# Patient Record
Sex: MALE | Race: BLACK OR AFRICAN AMERICAN | Employment: PART TIME | URBAN - METROPOLITAN AREA
[De-identification: names, ages, dates, MRNs, and addresses within clinical notes are randomized per-mention and may not be internally consistent; named-entity substitution may affect disease eponyms.]

---

## 2017-01-12 ENCOUNTER — NURSE NAVIGATOR (OUTPATIENT)
Dept: FAMILY MEDICINE CLINIC | Age: 47
End: 2017-01-12

## 2017-02-23 ENCOUNTER — OFFICE VISIT (OUTPATIENT)
Dept: FAMILY MEDICINE CLINIC | Age: 47
End: 2017-02-23

## 2017-02-23 VITALS
OXYGEN SATURATION: 98 % | HEIGHT: 68 IN | SYSTOLIC BLOOD PRESSURE: 143 MMHG | RESPIRATION RATE: 16 BRPM | BODY MASS INDEX: 32.25 KG/M2 | HEART RATE: 86 BPM | WEIGHT: 212.8 LBS | DIASTOLIC BLOOD PRESSURE: 95 MMHG | TEMPERATURE: 97.3 F

## 2017-02-23 DIAGNOSIS — J06.9 ACUTE URI: Primary | ICD-10-CM

## 2017-02-23 DIAGNOSIS — R05.9 COUGH: ICD-10-CM

## 2017-02-23 RX ORDER — PROMETHAZINE HYDROCHLORIDE AND CODEINE PHOSPHATE 6.25; 1 MG/5ML; MG/5ML
1 SOLUTION ORAL
Qty: 118 ML | Refills: 0 | Status: SHIPPED | OUTPATIENT
Start: 2017-02-23 | End: 2017-11-10 | Stop reason: ALTCHOICE

## 2017-02-23 RX ORDER — BENZONATATE 200 MG/1
200 CAPSULE ORAL
Qty: 30 CAP | Refills: 0 | Status: SHIPPED | OUTPATIENT
Start: 2017-02-23 | End: 2017-03-02

## 2017-02-23 RX ORDER — SULINDAC 200 MG/1
TABLET ORAL
COMMUNITY
Start: 2017-01-13 | End: 2017-11-10 | Stop reason: ALTCHOICE

## 2017-02-23 RX ORDER — AZITHROMYCIN 250 MG/1
TABLET, FILM COATED ORAL
Qty: 6 TAB | Refills: 0 | Status: SHIPPED | OUTPATIENT
Start: 2017-02-23 | End: 2017-02-28

## 2017-02-23 NOTE — PROGRESS NOTES
Rogers Haas is a 52 y.o. male. Patient complains of post nasal drip, productive cough with  yellow colored sputum and sore throat. Symptoms chills. Onset of symptoms was 4 days ago, gradually worsening since that time. He denies a history of chest pain, shortness of breath and wheezing. is drinking moderate amounts of fluids. Past history is significant for no history of pneumonia or bronchitis. Patient is former smoker, quit years ago    Past Medical History:   Diagnosis Date    Acute right-sided low back pain without sciatica 11/4/2016    Anxiety 11/4/2016    Chronic pain     Contact dermatitis and other eczema, due to unspecified cause     hives    Diabetes mellitus without complication ([facility] 28/3/9943    Diabetes mellitus without complication ([facility] 66/3/6733    Dyslipidemia 12/2/2014    Ear ringing 11/4/2016    Encounter for immunization 11/4/2016    Erectile dysfunction 11/4/2016    Herniated disc     Hypertension     Hypovitaminosis D 12/2/2014    Multiple joint pain 11/4/2016    Obesity     Primary insomnia 11/4/2016    Right wrist pain 12/2/2014    Screen for STD (sexually transmitted disease) 11/4/2016     Past Surgical History:   Procedure Laterality Date    HX ORTHOPAEDIC      lumbar steroid injection for pain management     No Known Allergies  Current Outpatient Prescriptions on File Prior to Visit   Medication Sig Dispense Refill    cyclobenzaprine (FLEXERIL) 10 mg tablet Take 0.5 Tabs by mouth nightly as needed for Muscle Spasm(s). 20 Tab 0    metFORMIN (GLUCOPHAGE) 500 mg tablet Take 1 Tab by mouth two (2) times daily (with meals). 60 Tab 2    Lancets misc Check once daily 1 Each 11    glucose blood VI test strips (ASCENSIA AUTODISC VI, ONE TOUCH ULTRA TEST VI) strip Check once daily 50 Strip 5    omeprazole (PRILOSEC) 20 mg capsule Take 1 Cap by mouth daily. 30 Cap 2    traZODone (DESYREL) 100 mg tablet Take 1 Tab by mouth nightly.  30 Tab 2    tadalafil (CIALIS) 20 mg tablet Take 1 Tab by mouth as needed. 6 Tab 2    naproxen (NAPROSYN) 500 mg tablet Take 1 Tab by mouth two (2) times daily (with meals). 30 Tab 3     No current facility-administered medications on file prior to visit. Objective:      Visit Vitals    BP (!) 143/95    Pulse 86    Temp 97.3 °F (36.3 °C) (Oral)    Resp 16    Ht 5' 8\" (1.727 m)    Wt 212 lb 12.8 oz (96.5 kg)    SpO2 98%    BMI 32.36 kg/m2      Appears ill-appearing. Ears: bilateral TM's and external ear canals normal  Nose: clear discharge, moderate congestion  Oropharynx: erythematous  Neck: bilateral symmetric anterior adenopathy  Lungs: clear to auscultation, no wheezes, rales or rhonchi, symmetric air entry  CVS: regular rate and rhythm, S1, S2 normal, no murmur, click, rub or gallop  The abdomen is soft without tenderness or hepatosplenomegaly. Rapid Strep test is not done      Assessment/Plan:   viral upper respiratory illness, influenza, strep pharyngitis, sinusitis, nasopharyngitis, bronchitis and pneumonia  Per orders. Gargle, use acetaminophen or other OTC analgesic, and take Rx fully as prescribed. Call if other family members develop similar symptoms. See prn. Yue Egan was seen today for cough. Diagnoses and all orders for this visit:    Acute URI  -     azithromycin (ZITHROMAX) 250 mg tablet; Take 2 tablets today, then take 1 tablet daily  -     promethazine-codeine (PHENERGAN WITH CODEINE) 6.25-10 mg/5 mL syrup; Take 5 mL by mouth nightly as needed for Cough. Max Daily Amount: 5 mL. -     benzonatate (TESSALON) 200 mg capsule; Take 1 Cap by mouth three (3) times daily as needed for Cough for up to 7 days. Cough  -     azithromycin (ZITHROMAX) 250 mg tablet; Take 2 tablets today, then take 1 tablet daily  -     promethazine-codeine (PHENERGAN WITH CODEINE) 6.25-10 mg/5 mL syrup; Take 5 mL by mouth nightly as needed for Cough. Max Daily Amount: 5 mL. -     benzonatate (TESSALON) 200 mg capsule;  Take 1 Cap by mouth three (3) times daily as needed for Cough for up to 7 days. Follow-up Disposition:  Return in about 3 days (around 2/26/2017), or if symptoms worsen or fail to improve.       Avani Cherry MD  2/23/2017

## 2017-02-23 NOTE — PROGRESS NOTES
Chief Complaint   Patient presents with    Cough     red itchy eyes, congestion, yellow mucus, coughs til his chest hurts

## 2017-02-23 NOTE — MR AVS SNAPSHOT
Visit Information Date & Time Provider Department Dept. Phone Encounter #  
 2/23/2017  9:15 AM Nohelia Meyers MD [facility] at [address] [facility] [id number] Follow-up Instructions Return in about 3 days (around 2/26/2017), or if symptoms worsen or fail to improve. Your Appointments 2/23/2017  9:15 AM  
ACUTE CARE with Nohelia Meyers MD  
[facility] at [address] [facility] [address]) Appt Note: cough that causes back and chest pain, congestion and red itchy eyes. cp $5  2.23.17  
 [address] [facility] [address] [id number] [facility] Upcoming Health Maintenance Date Due  
 FOOT EXAM Q1 1/29/1980 EYE EXAM RETINAL OR DILATED Q1 1/29/1980 Pneumococcal 19-64 Medium Risk (1 of 1 - PPSV23) 1/29/1989 MICROALBUMIN Q1 3/18/2017 LIPID PANEL Q1 3/18/2017 HEMOGLOBIN A1C Q6M 5/4/2017 DTaP/Tdap/Td series (2 - Td) 11/20/2024 Allergies as of 2/23/2017  Review Complete On: 12/5/2016 By: Nohelia Meyers MD  
 No Known Allergies Current Immunizations  Reviewed on 2/22/2016 Name Date Influenza Vaccine 11/3/2016, 11/20/2014  1:26 PM  
 Tdap 11/20/2014  4:06 PM  
  
 Not reviewed this visit You Were Diagnosed With   
  
 Codes Comments Acute URI    -  Primary ICD-10-CM: J06.9 ICD-9-CM: 465.9 Cough     ICD-10-CM: R05 ICD-9-CM: 811. 2 Vitals BP  
  
  
  
  
  
 (!) 143/95 Vitals History BMI and BSA Data Body Mass Index Body Surface Area  
 32.36 kg/m 2 2.15 m 2 Preferred Pharmacy Pharmacy Name Phone [address] [facility], [address] [facility] [phone] Your Updated Medication List  
  
   
This list is accurate as of: 2/23/17  9:09 AM.  Always use your most recent med list.  
  
  
  
  
 azithromycin 250 mg tablet Commonly known as:  Sherren Mohair Take 2 tablets today, then take 1 tablet daily  
  
 benzonatate 200 mg capsule Commonly known as:  TESSALON Take 1 Cap by mouth three (3) times daily as needed for Cough for up to 7 days. cyclobenzaprine 10 mg tablet Commonly known as:  FLEXERIL Take 0.5 Tabs by mouth nightly as needed for Muscle Spasm(s). glucose blood VI test strips strip Commonly known as:  ASCENSIA AUTODISC VI, ONE TOUCH ULTRA TEST VI Check once daily Lancets Misc Check once daily  
  
 metFORMIN 500 mg tablet Commonly known as:  GLUCOPHAGE Take 1 Tab by mouth two (2) times daily (with meals). naproxen 500 mg tablet Commonly known as:  NAPROSYN Take 1 Tab by mouth two (2) times daily (with meals). omeprazole 20 mg capsule Commonly known as:  PRILOSEC Take 1 Cap by mouth daily. promethazine-codeine 6.25-10 mg/5 mL syrup Commonly known as:  PHENERGAN with CODEINE Take 5 mL by mouth nightly as needed for Cough. Max Daily Amount: 5 mL. sulindac 200 mg tablet Commonly known as:  CLINORIL  
  
 tadalafil 20 mg tablet Commonly known as:  CIALIS Take 1 Tab by mouth as needed. traZODone 100 mg tablet Commonly known as:  [place]ry [place] Take 1 Tab by mouth nightly. Prescriptions Printed Refills  
 promethazine-codeine (PHENERGAN WITH CODEINE) 6.25-10 mg/5 mL syrup 0 Sig: Take 5 mL by mouth nightly as needed for Cough. Max Daily Amount: 5 mL. Class: Print Route: Oral  
  
Prescriptions Sent to Pharmacy Refills  
 azithromycin (ZITHROMAX) 250 mg tablet 0 Sig: Take 2 tablets today, then take 1 tablet daily Class: Normal  
 Pharmacy: [facility] [address] [facility] [address] [facility] Ph #: [phone]  
 benzonatate (TESSALON) 200 mg capsule 0 Sig: Take 1 Cap by mouth three (3) times daily as needed for Cough for up to 7 days.   
 Class: Normal  
 Pharmacy: [facility] [address] [facility] [address] [facility] #: [phone] Route: Oral  
  
Follow-up Instructions Return in about 3 days (around 2/26/2017), or if symptoms worsen or fail to improve. Introducing [facility] & HEALTH SERVICES! Dear Onesimo Hunt: 
Thank you for requesting a Groopt account. Our records indicate that you already have an active Groopt account. You can access your account anytime at [email or web address] Did you know that you can access your hospital and ER discharge instructions at any time in Groopt? You can also review all of your test results from your hospital stay or ER visit. Additional Information If you have questions, please visit the Frequently Asked Questions section of the Groopt website at [email or web address] Remember, Groopt is NOT to be used for urgent needs. For medical emergencies, dial 911. Now available from your iPhone and Android! Please provide this summary of care documentation to your next provider. Your primary care clinician is listed as Avani Cherry. If you have any questions after today's visit, please call [phone].

## 2017-03-08 PROBLEM — M51.36 DDD (DEGENERATIVE DISC DISEASE), LUMBAR: Status: ACTIVE | Noted: 2017-03-08

## 2017-05-12 ENCOUNTER — TELEPHONE (OUTPATIENT)
Dept: FAMILY MEDICINE CLINIC | Age: 47
End: 2017-05-12

## 2017-05-12 ENCOUNTER — OFFICE VISIT (OUTPATIENT)
Dept: FAMILY MEDICINE CLINIC | Age: 47
End: 2017-05-12

## 2017-05-12 VITALS
BODY MASS INDEX: 32.13 KG/M2 | WEIGHT: 212 LBS | OXYGEN SATURATION: 96 % | DIASTOLIC BLOOD PRESSURE: 79 MMHG | HEIGHT: 68 IN | HEART RATE: 79 BPM | SYSTOLIC BLOOD PRESSURE: 135 MMHG | TEMPERATURE: 96.9 F | RESPIRATION RATE: 16 BRPM

## 2017-05-12 DIAGNOSIS — M79.7 FIBROMYALGIA: Primary | ICD-10-CM

## 2017-05-12 DIAGNOSIS — Z79.899 ENCOUNTER FOR LONG-TERM (CURRENT) USE OF OTHER MEDICATIONS: ICD-10-CM

## 2017-05-12 DIAGNOSIS — F45.20 HYPOCHONDRIACAL DISORDER: ICD-10-CM

## 2017-05-12 DIAGNOSIS — Z91.199 NONCOMPLIANCE: ICD-10-CM

## 2017-05-12 DIAGNOSIS — R07.89 ATYPICAL CHEST PAIN: ICD-10-CM

## 2017-05-12 DIAGNOSIS — E11.9 DIABETES MELLITUS WITHOUT COMPLICATION (HCC): ICD-10-CM

## 2017-05-12 RX ORDER — PREGABALIN 75 MG/1
75 CAPSULE ORAL 2 TIMES DAILY
Qty: 60 CAP | Refills: 1 | Status: SHIPPED | OUTPATIENT
Start: 2017-05-12 | End: 2017-11-10

## 2017-05-12 RX ORDER — CITALOPRAM 20 MG/1
20 TABLET, FILM COATED ORAL DAILY
Qty: 30 TAB | Refills: 1 | Status: SHIPPED | OUTPATIENT
Start: 2017-05-12 | End: 2017-11-10

## 2017-05-12 RX ORDER — DULOXETIN HYDROCHLORIDE 30 MG/1
30 CAPSULE, DELAYED RELEASE ORAL DAILY
Qty: 30 CAP | Refills: 1 | Status: SHIPPED | OUTPATIENT
Start: 2017-05-12 | End: 2017-11-10 | Stop reason: ALTCHOICE

## 2017-05-12 NOTE — PROGRESS NOTES
Kamar Clay is a 52 y.o. male    He have had multiple complains regularly. Wanting everything done. When sent to specialist, after negative work up he wants another referral for 2nd opinion. Please look at referral list.  He have been sent to Neuro, derm, GI, Rheum a couple of times, ENT, Orthopedics, etc... By me and a few other doctors. I was skylar with him and he smiled and agreed that he doesn't trust doctor. He goes home and read stuff online and if the doctor tells him differently he disagrees, wants referral and if that's not what he agrees to he ask for 2nd referral.  In the mean while he's not taking medications that doctors prescribed him because he disagrees. He gets very sensitive if I mentioned seeing Psych, says he's not crazy and there were no need, hence it's not been done by me, but it was mentioned a few times in the past.     He has various pain all over body that is negative on exam.  Non compliance with meds. Today he wants to address concern below:     Rash want refer to derm. For Keloid. After discussion he decided that can wait. ENT: didn't trust the first ENT wanted his throat scoped, but he'll agree to go back there. Thinks he has MS. Wanted to be tested for MS. He doesn't have any weakness of extremities or vision issue or loss of bowel or bladder function. But due to his multiple shoulder, joint aches, etc... I explained to him what MS is and he's OK with not getting another neuro referral for now. Sharp chest pain that comes randomly, lasting seconds. Can sit there and it happens. No associated SOB. Denies JAMESON. No edema or orthopnea. Denies Syncope, dizziness. Also says he think he has a heart murmur. I've never heard one and still do not hear one today. Currently he has no chest pain. Denies cough, fever, chills, JAMESON, SOB. We discussed trying medication for pain such as cymbalta or celexa and lyrica.   He's not sure but will look to see what he's willing to take. Reviewed: active problem list, medication list, allergies, notes from last encounter, lab results, imaging    A comprehensive review of systems was negative except for that written in the HPI. on 12 ROS. No Known Allergies  Current Outpatient Prescriptions on File Prior to Visit   Medication Sig Dispense Refill    metFORMIN (GLUCOPHAGE) 500 mg tablet Take 1 Tab by mouth two (2) times daily (with meals). 60 Tab 2    Lancets misc Check once daily 1 Each 11    glucose blood VI test strips (ASCENSIA AUTODISC VI, ONE TOUCH ULTRA TEST VI) strip Check once daily 50 Strip 5    omeprazole (PRILOSEC) 20 mg capsule Take 1 Cap by mouth daily. 30 Cap 2    sulindac (CLINORIL) 200 mg tablet       promethazine-codeine (PHENERGAN WITH CODEINE) 6.25-10 mg/5 mL syrup Take 5 mL by mouth nightly as needed for Cough. Max Daily Amount: 5 mL. 118 mL 0    traZODone (DESYREL) 100 mg tablet Take 1 Tab by mouth nightly. 30 Tab 2    tadalafil (CIALIS) 20 mg tablet Take 1 Tab by mouth as needed. 6 Tab 2    cyclobenzaprine (FLEXERIL) 10 mg tablet Take 0.5 Tabs by mouth nightly as needed for Muscle Spasm(s). 20 Tab 0    naproxen (NAPROSYN) 500 mg tablet Take 1 Tab by mouth two (2) times daily (with meals). 30 Tab 3     No current facility-administered medications on file prior to visit.       Patient Active Problem List   Diagnosis Code    Obesity E66.9    Fatigue R53.83    abdominal pain LLQ R10.9    Bursitis of left shoulder M75.52    Tinnitus of both ears H93.13    Fatigue R53.83    Decreased libido R68.82    Elevated liver enzymes R74.8    Hypovitaminosis D E55.9    Right wrist pain M25.531    Dyslipidemia O67.1    Eosinophilic esophagitis O65.1    Alarcon's esophagus without dysplasia K22.70    Diabetes mellitus without complication (HCC) Z33.7    Arthralgia of multiple sites, bilateral M25.50    Acute right-sided low back pain without sciatica M54.5    Primary insomnia F51.01  Anxiety F41.9    Ear ringing H93.19    Erectile dysfunction N52.9    Encounter for immunization [place] [address] for STD (sexually transmitted disease) Z11.3    Alcohol induced liver disorder ([address] K70.9    DDD (degenerative disc disease), lumbar M51.36    Noncompliance Z91.19    Hypochondriacal disorder F45.20       Visit Vitals    /79 (BP 1 Location: Left arm, BP Patient Position: Sitting)    Pulse 79    Temp 96.9 °F (36.1 °C) (Oral)    Resp 16    Ht 5' 8\" (1.727 m)    Wt 212 lb (96.2 kg)    SpO2 96%    BMI 32.23 kg/m2     General appearance: alert, cooperative, no distress, appears stated age  Neurologic: Alert and oriented X 3, normal strength and tone, symmetric. Normal without focal findings. Cranial nerves 2-12 intact. Normal coordination and gait. Mental status: Alert, oriented, thought content appropriate, affect: stable, mood-congruent. Head: Normocephalic, without obvious abnormality, atraumatic  Eyes: conjunctivae/corneas clear. PERRL, EOM's intact. Neck: supple, symmetrical, trachea midline, no JVD  Lungs: clear to auscultation bilaterally  Heart: regular rate and rhythm, S1, S2 normal, no murmur, click, rub or gallop  Abdomen: soft, non-tender. Extremities: extremities normal, atraumatic, no cyanosis or edema      EKG: normal EKG, normal sinus rhythm, normal Axis. Assessment/Plans:    More than 40mins was spend with this patient. Fibromyalgia  -     METABOLIC PANEL, COMPREHENSIVE  -     HEMOGLOBIN A1C W/O EAG  -     CBC W/O DIFF  -     DULoxetine (CYMBALTA) 30 mg capsule; Take 1 Cap by mouth daily. -     pregabalin (LYRICA) 75 mg capsule; Take 1 Cap by mouth two (2) times a day. Max Daily Amount: 150 mg.  -     citalopram (CELEXA) 20 mg tablet; Take 1 Tab by mouth daily. Diabetes mellitus without complication (HCC)  -     METABOLIC PANEL, COMPREHENSIVE  -     HEMOGLOBIN A1C W/O EAG  -     CBC W/O DIFF  -     DULoxetine (CYMBALTA) 30 mg capsule;  Take 1 Cap by mouth daily. -     pregabalin (LYRICA) 75 mg capsule; Take 1 Cap by mouth two (2) times a day. Max Daily Amount: 150 mg.  -     citalopram (CELEXA) 20 mg tablet; Take 1 Tab by mouth daily. Encounter for long-term (current) use of other medications  -     METABOLIC PANEL, COMPREHENSIVE  -     HEMOGLOBIN A1C W/O EAG  -     CBC W/O DIFF  -     DULoxetine (CYMBALTA) 30 mg capsule; Take 1 Cap by mouth daily. -     pregabalin (LYRICA) 75 mg capsule; Take 1 Cap by mouth two (2) times a day. Max Daily Amount: 150 mg.  -     citalopram (CELEXA) 20 mg tablet; Take 1 Tab by mouth daily. Atypical chest pain  -     AMB POC EKG ROUTINE W/ 12 LEADS, INTER & REP    Noncompliance    Hypochondriacal disorder      Discussed plans, risk/benefits of treatments/observations. Through the use of shared decision making, above plans were agreed upon. Medication compliance advised. Patient verbalized understanding. Follow-up Disposition:  Return in about 4 weeks (around 6/9/2017) for diabetes, fibromyalgia, meds.       Blossom Kay MD  5/22/2017

## 2017-05-12 NOTE — MR AVS SNAPSHOT
Visit Information Date & Time Provider Department Dept. Phone Encounter #  
 5/12/2017  4:00 PM Jere White MD [facility] at [address] [id number] Follow-up Instructions Return in about 4 weeks (around 6/9/2017) for diabetes, fibromyalgia, meds. Upcoming Health Maintenance Date Due  
 FOOT EXAM Q1 1/29/1980 EYE EXAM RETINAL OR DILATED Q1 1/29/1980 Pneumococcal 19-64 Medium Risk (1 of 1 - PPSV23) 1/29/1989 MICROALBUMIN Q1 3/18/2017 LIPID PANEL Q1 3/18/2017 HEMOGLOBIN A1C Q6M 5/4/2017 INFLUENZA AGE 9 TO ADULT 8/1/2017 DTaP/Tdap/Td series (2 - Td) 11/20/2024 Allergies as of 5/12/2017  Review Complete On: 2/23/2017 By: Jere White MD  
 No Known Allergies Current Immunizations  Reviewed on 2/22/2016 Name Date Influenza Vaccine 11/3/2016, 11/20/2014  1:26 PM  
 Tdap 11/20/2014  4:06 PM  
  
 Not reviewed this visit You Were Diagnosed With   
  
 Codes Comments Fibromyalgia    -  Primary ICD-10-CM: M79.7 ICD-9-CM: 729.1 Diabetes mellitus without complication ([facility]     DPB-24-HW: E11.9 ICD-9-CM: 250.00 Encounter for long-term (current) use of other medications     ICD-10-CM: Z79.899 ICD-9-CM: V58.69 Vitals Weight(growth percentile) BMI Smoking Status 212 lb (96.2 kg) 32.23 kg/m2 Former Smoker BMI and BSA Data Body Mass Index Body Surface Area  
 32.23 kg/m 2 2.15 m 2 [facility] [address] [facility] [phone] Your Updated Medication List  
  
   
This list is accurate as of: 5/12/17  4:11 PM.  Always use your most recent med list.  
  
  
  
  
 citalopram 20 mg tablet Commonly known as:  Garon Eng Take 1 Tab by mouth daily. cyclobenzaprine 10 mg tablet Commonly known as:  FLEXERIL  
 Take 0.5 Tabs by mouth nightly as needed for Muscle Spasm(s). DULoxetine 30 mg capsule Commonly known as:  CYMBALTA Take 1 Cap by mouth daily. glucose blood VI test strips strip Commonly known as:  ASCENSIA AUTODISC VI, ONE TOUCH ULTRA TEST VI Check once daily Lancets Misc Check once daily  
  
 metFORMIN 500 mg tablet Commonly known as:  GLUCOPHAGE Take 1 Tab by mouth two (2) times daily (with meals). naproxen 500 mg tablet Commonly known as:  NAPROSYN Take 1 Tab by mouth two (2) times daily (with meals). omeprazole 20 mg capsule Commonly known as:  PRILOSEC Take 1 Cap by mouth daily. pregabalin 75 mg capsule Commonly known as:  [place] Beery Take 1 Cap by mouth two (2) times a day. Max Daily Amount: 150 mg.  
  
 promethazine-codeine 6.25-10 mg/5 mL syrup Commonly known as:  PHENERGAN with CODEINE Take 5 mL by mouth nightly as needed for Cough. Max Daily Amount: 5 mL. sulindac 200 mg tablet Commonly known as:  CLINORIL  
  
 tadalafil 20 mg tablet Commonly known as:  CIALIS Take 1 Tab by mouth as needed. traZODone 100 mg tablet Commonly known as:  Waylon Victorino Take 1 Tab by mouth nightly. Prescriptions Printed Refills  
 pregabalin (LYRICA) 75 mg capsule 1 Sig: Take 1 Cap by mouth two (2) times a day. Max Daily Amount: 150 mg.  
 Class: Print Route: Oral  
  
Prescriptions Sent to Pharmacy Refills DULoxetine (CYMBALTA) 30 mg capsule 1 Sig: Take 1 Cap by mouth daily. Class: Normal  
 Pharmacy: [facility], [address] [facility] Ph #: [phone] Route: Oral  
 citalopram (CELEXA) 20 mg tablet 1 Sig: Take 1 Tab by mouth daily. Class: Normal  
 Pharmacy: [facility], [address] [place] [address] RD [address] [facility] Ph #: [phone] Route: Oral  
  
We Performed the Following CBC W/O DIFF [91937 CPT(R)] HEMOGLOBIN A1C W/O EAG [05967 CPT(R)] METABOLIC PANEL, COMPREHENSIVE [66849 CPT(R)] Follow-up Instructions Return in about 4 weeks (around 6/9/2017) for diabetes, fibromyalgia, meds. Introducing [facility] & HEALTH SERVICES! Dear Laurie Lemos: 
Thank you for requesting a WHOOP account. Our records indicate that you already have an active WHOOP account. You can access your account anytime at [email or web address] Did you know that you can access your hospital and ER discharge instructions at any time in WHOOP? You can also review all of your test results from your hospital stay or ER visit. Additional Information If you have questions, please visit the Frequently Asked Questions section of the WHOOP website at [email or web address] Remember, WHOOP is NOT to be used for urgent needs. For medical emergencies, dial 911. Now available from your iPhone and Android! Please provide this summary of care documentation to your next provider. Your primary care clinician is listed as Cliff Saleh. If you have any questions after today's visit, please call [phone].

## 2017-05-20 LAB
ALBUMIN SERPL-MCNC: 4.4 G/DL (ref 3.5–5.5)
ALBUMIN/GLOB SERPL: 1.9 {RATIO} (ref 1.2–2.2)
ALP SERPL-CCNC: 55 IU/L (ref 39–117)
ALT SERPL-CCNC: 53 IU/L (ref 0–44)
AST SERPL-CCNC: 28 IU/L (ref 0–40)
BILIRUB SERPL-MCNC: <0.2 MG/DL (ref 0–1.2)
BUN SERPL-MCNC: 11 MG/DL (ref 6–24)
BUN/CREAT SERPL: 11 (ref 9–20)
CALCIUM SERPL-MCNC: 9.5 MG/DL (ref 8.7–10.2)
CHLORIDE SERPL-SCNC: 103 MMOL/L (ref 96–106)
CO2 SERPL-SCNC: 21 MMOL/L (ref 18–29)
CREAT SERPL-MCNC: 1.01 MG/DL (ref 0.76–1.27)
ERYTHROCYTE [DISTWIDTH] IN BLOOD BY AUTOMATED COUNT: 12.6 % (ref 12.3–15.4)
GLOBULIN SER CALC-MCNC: 2.3 G/DL (ref 1.5–4.5)
GLUCOSE SERPL-MCNC: 153 MG/DL (ref 65–99)
HBA1C MFR BLD: 6.4 % (ref 4.8–5.6)
HCT VFR BLD AUTO: 39 % (ref 37.5–51)
HGB BLD-MCNC: 12.8 G/DL (ref 12.6–17.7)
MCH RBC QN AUTO: 28.6 PG (ref 26.6–33)
MCHC RBC AUTO-ENTMCNC: 32.8 G/DL (ref 31.5–35.7)
MCV RBC AUTO: 87 FL (ref 79–97)
PLATELET # BLD AUTO: 374 X10E3/UL (ref 150–379)
POTASSIUM SERPL-SCNC: 4.4 MMOL/L (ref 3.5–5.2)
PROT SERPL-MCNC: 6.7 G/DL (ref 6–8.5)
RBC # BLD AUTO: 4.47 X10E6/UL (ref 4.14–5.8)
SODIUM SERPL-SCNC: 140 MMOL/L (ref 134–144)
WBC # BLD AUTO: 6.6 X10E3/UL (ref 3.4–10.8)

## 2017-05-22 PROBLEM — F45.20 HYPOCHONDRIACAL DISORDER: Status: ACTIVE | Noted: 2017-05-22

## 2017-05-22 PROBLEM — Z91.199 NONCOMPLIANCE: Status: ACTIVE | Noted: 2017-05-22

## 2017-05-25 ENCOUNTER — TELEPHONE (OUTPATIENT)
Dept: FAMILY MEDICINE CLINIC | Age: 47
End: 2017-05-25

## 2017-06-15 ENCOUNTER — TELEPHONE (OUTPATIENT)
Dept: FAMILY MEDICINE CLINIC | Age: 47
End: 2017-06-15

## 2017-06-15 DIAGNOSIS — M54.5 LOW BACK PAIN, UNSPECIFIED BACK PAIN LATERALITY, UNSPECIFIED CHRONICITY, WITH SCIATICA PRESENCE UNSPECIFIED: Primary | ICD-10-CM

## 2017-06-20 ENCOUNTER — TELEPHONE (OUTPATIENT)
Dept: FAMILY MEDICINE CLINIC | Age: 47
End: 2017-06-20

## 2017-06-20 NOTE — TELEPHONE ENCOUNTER
Returned call no answer or voice message set up unable to leave message. If pt should return call he needs to make appt. To be seen by Dr. Gabriel Perales has he was due back 6/8/17 and did not make appt.  Now asking for referral to cardiologist.

## 2017-06-21 DIAGNOSIS — K21.9 GASTROESOPHAGEAL REFLUX DISEASE WITHOUT ESOPHAGITIS: ICD-10-CM

## 2017-06-21 RX ORDER — OMEPRAZOLE 20 MG/1
20 CAPSULE, DELAYED RELEASE ORAL DAILY
Qty: 30 CAP | Refills: 5 | Status: SHIPPED | OUTPATIENT
Start: 2017-06-21

## 2017-06-29 ENCOUNTER — TELEPHONE (OUTPATIENT)
Dept: FAMILY MEDICINE CLINIC | Age: 47
End: 2017-06-29

## 2017-06-29 NOTE — TELEPHONE ENCOUNTER
Pt would like a referral for: Dermatologist     7/19/17 Ignacio Leon on [facility]   Re: block spots on his body     Pt phone number is [phone]

## 2017-06-29 NOTE — TELEPHONE ENCOUNTER
Spoke with pt he said he wants his meds to go to [facility]. It has been changed in his chart. Told him he was due to see  This month and did not f/u that may be why he only gave him 30 pills.

## 2017-06-29 NOTE — TELEPHONE ENCOUNTER
Pt needs a prescription sent to new [address] [place] [address] Road     Re; omeprazole (PRILOSEC) 20 mg capsule & he would like 90 day supply     Best number to reach him is [phone]

## 2017-10-26 ENCOUNTER — TELEPHONE (OUTPATIENT)
Dept: FAMILY MEDICINE CLINIC | Age: 47
End: 2017-10-26

## 2017-10-26 NOTE — TELEPHONE ENCOUNTER
----- Message from Kathya Marie sent at 10/26/2017 12:04 PM EDT -----  Regarding: Dr. Zee Marie  The patient is requesting a call back to discuss his heart situation.  (m)[phone]

## 2017-11-10 ENCOUNTER — OFFICE VISIT (OUTPATIENT)
Dept: FAMILY MEDICINE CLINIC | Age: 47
End: 2017-11-10

## 2017-11-10 VITALS
SYSTOLIC BLOOD PRESSURE: 144 MMHG | WEIGHT: 206.8 LBS | HEIGHT: 68 IN | BODY MASS INDEX: 31.34 KG/M2 | DIASTOLIC BLOOD PRESSURE: 82 MMHG | TEMPERATURE: 98.2 F | RESPIRATION RATE: 16 BRPM | OXYGEN SATURATION: 97 % | HEART RATE: 80 BPM

## 2017-11-10 DIAGNOSIS — E11.9 DIABETES MELLITUS WITHOUT COMPLICATION (HCC): Primary | ICD-10-CM

## 2017-11-10 DIAGNOSIS — M54.50 ACUTE RIGHT-SIDED LOW BACK PAIN WITHOUT SCIATICA: ICD-10-CM

## 2017-11-10 DIAGNOSIS — Z23 ENCOUNTER FOR IMMUNIZATION: ICD-10-CM

## 2017-11-10 DIAGNOSIS — M25.50 MULTIPLE JOINT PAIN: ICD-10-CM

## 2017-11-10 DIAGNOSIS — M79.7 FIBROMYALGIA: ICD-10-CM

## 2017-11-10 DIAGNOSIS — R00.2 PALPITATION: ICD-10-CM

## 2017-11-10 RX ORDER — OMEPRAZOLE 40 MG/1
CAPSULE, DELAYED RELEASE ORAL
COMMUNITY
Start: 2017-07-05 | End: 2017-12-08 | Stop reason: ALTCHOICE

## 2017-11-10 RX ORDER — DICYCLOMINE HYDROCHLORIDE 10 MG/1
CAPSULE ORAL
COMMUNITY
Start: 2017-06-20 | End: 2017-12-08 | Stop reason: ALTCHOICE

## 2017-11-10 RX ORDER — NAPROXEN 500 MG/1
500 TABLET ORAL 2 TIMES DAILY WITH MEALS
Qty: 30 TAB | Refills: 3 | Status: SHIPPED | OUTPATIENT
Start: 2017-11-10 | End: 2022-09-27

## 2017-11-10 RX ORDER — DULOXETIN HYDROCHLORIDE 60 MG/1
60 CAPSULE, DELAYED RELEASE ORAL
Qty: 30 CAP | Refills: 2 | Status: SHIPPED | OUTPATIENT
Start: 2017-11-10 | End: 2022-09-27

## 2017-11-10 RX ORDER — METFORMIN HYDROCHLORIDE 500 MG/1
TABLET ORAL
COMMUNITY
Start: 2017-06-20 | End: 2017-11-10 | Stop reason: SDUPTHER

## 2017-11-10 RX ORDER — METFORMIN HYDROCHLORIDE 500 MG/1
500 TABLET ORAL 2 TIMES DAILY WITH MEALS
Qty: 60 TAB | Refills: 2 | Status: SHIPPED | OUTPATIENT
Start: 2017-11-10

## 2017-11-10 RX ORDER — METHOCARBAMOL 750 MG/1
750 TABLET, FILM COATED ORAL
COMMUNITY
Start: 2017-07-07 | End: 2017-11-10 | Stop reason: ALTCHOICE

## 2017-11-10 RX ORDER — MELOXICAM 15 MG/1
15 TABLET ORAL
COMMUNITY
Start: 2017-07-07 | End: 2017-11-10 | Stop reason: ALTCHOICE

## 2017-11-10 RX ORDER — CYCLOBENZAPRINE HCL 10 MG
5 TABLET ORAL
Qty: 20 TAB | Refills: 0 | Status: SHIPPED | OUTPATIENT
Start: 2017-11-10 | End: 2022-09-27

## 2017-11-10 NOTE — MR AVS SNAPSHOT
Visit Information Date & Time Provider Department Dept. Phone Encounter #  
 11/10/2017  3:00 PM Pedro Coreas MD [facility] at [address] [id number] Upcoming Health Maintenance Date Due  
 FOOT EXAM Q1 1/29/1980 EYE EXAM RETINAL OR DILATED Q1 1/29/1980 Pneumococcal 19-64 Medium Risk (1 of 1 - PPSV23) 1/29/1989 MICROALBUMIN Q1 3/18/2017 LIPID PANEL Q1 3/18/2017 Influenza Age 5 to Adult 8/1/2017 HEMOGLOBIN A1C Q6M 11/19/2017 DTaP/Tdap/Td series (2 - Td) 11/20/2024 Allergies as of 11/10/2017  Review Complete On: 11/10/2017 By: Abby Mckinnon LPN No Known Allergies Current Immunizations  Reviewed on 2/22/2016 Name Date Influenza Vaccine 11/3/2016, 11/20/2014  1:26 PM  
 Influenza Vaccine (Quad) PF  Incomplete Pneumococcal Polysaccharide (PPSV-23)  Incomplete Tdap 11/20/2014  4:06 PM  
  
 Not reviewed this visit You Were Diagnosed With   
  
 Codes Comments Encounter for immunization    -  Primary ICD-10-CM: V85 ICD-9-CM: V03.89 Diabetes mellitus without complication ([facility]     MIRTHA-98-EA: E11.9 ICD-9-CM: 250.00 Acute right-sided low back pain without sciatica     ICD-10-CM: M54.5 ICD-9-CM: 724.2 Fibromyalgia     ICD-10-CM: M79.7 ICD-9-CM: 729.1 Palpitation     ICD-10-CM: R00.2 ICD-9-CM: 785.1 Multiple joint pain     ICD-10-CM: M25.50 ICD-9-CM: 719.49 Vitals BP Pulse Temp Resp Height(growth percentile) Weight(growth percentile) 144/82 (BP 1 Location: Right arm, BP Patient Position: Sitting) 80 98.2 °F (36.8 °C) (Oral) 16 5' 8\" (1.727 m) 206 lb 12.8 oz (93.8 kg) SpO2 BMI Smoking Status 97% 31.44 kg/m2 Former Smoker BMI and BSA Data Body Mass Index Body Surface Area  
 31.44 kg/m 2 2.12 m 2 Preferred Pharmacy Pharmacy Name Phone [email or web address], [address] [phone] Your Updated Medication List  
  
   
 This list is accurate as of: 11/10/17  4:41 PM.  Always use your most recent med list.  
  
  
  
  
 cyclobenzaprine 10 mg tablet Commonly known as:  FLEXERIL Take 0.5 Tabs by mouth nightly as needed for Muscle Spasm(s). dicyclomine 10 mg capsule Commonly known as:  BENTYL DULoxetine 60 mg capsule Commonly known as:  CYMBALTA Take 1 Cap by mouth nightly. glucose blood VI test strips strip Commonly known as:  ASCENSIA AUTODISC VI, ONE TOUCH ULTRA TEST VI Check once daily Lancets Misc Check once daily  
  
 metFORMIN 500 mg tablet Commonly known as:  GLUCOPHAGE Take 1 Tab by mouth two (2) times daily (with meals). naproxen 500 mg tablet Commonly known as:  NAPROSYN Take 1 Tab by mouth two (2) times daily (with meals). * omeprazole 20 mg capsule Commonly known as:  PRILOSEC Take 1 Cap by mouth daily. * omeprazole 40 mg capsule Commonly known as:  PRILOSEC  
  
 tadalafil 20 mg tablet Commonly known as:  CIALIS Take 1 Tab by mouth as needed. * Notice: This list has 2 medication(s) that are the same as other medications prescribed for you. Read the directions carefully, and ask your doctor or other care provider to review them with you. Prescriptions Sent to Pharmacy Refills  
 cyclobenzaprine (FLEXERIL) 10 mg tablet 0 Sig: Take 0.5 Tabs by mouth nightly as needed for Muscle Spasm(s). Class: Normal  
 Pharmacy: [email or web address] [address] Ph #: [phone] Route: Oral  
 metFORMIN (GLUCOPHAGE) 500 mg tablet 2 Sig: Take 1 Tab by mouth two (2) times daily (with meals). Class: Normal  
 Pharmacy: [email or web address] [address] Ph #: [phone] Route: Oral  
 DULoxetine (CYMBALTA) 60 mg capsule 2 Sig: Take 1 Cap by mouth nightly.   
 Class: Normal  
 Pharmacy: [email or web address] [address] Ph #: [phone] Route: Oral  
 naproxen (NAPROSYN) 500 mg tablet 3 Sig: Take 1 Tab by mouth two (2) times daily (with meals). Class: Normal  
 Pharmacy: [email or web address] [address] Ph #: [phone] Route: Oral  
  
We Performed the Following HEMOGLOBIN A1C W/O EAG [53848 CPT(R)] INFLUENZA VIRUS VAC QUAD,SPLIT,PRESV FREE SYRINGE IM [id number] CPT(R)] METABOLIC PANEL, COMPREHENSIVE [16173 CPT(R)] PNEUMOCOCCAL POLYSACCHARIDE VACCINE, 23-VALENT, ADULT OR IMMUNOSUPPRESSED PT DOSE, [34253 CPT(R)] SD IMMUNIZ ADMIN,1 SINGLE/COMB VAC/TOXOID [id number] CPT(R)] REFERRAL TO CARDIAC ELECTROPHYSIOLOGY [REF11 Custom] Comments:  
 Per pt request.  Palpitation, EKG here normal.  
  
Referral Information Referral ID Referred By Referred To  
  
 [id number] [facility], [address] [place] [address], [address] Phone: [phone] Fax: [phone] Visits Status Start Date End Date 1 New Request 11/10/17 11/10/18 If your referral has a status of pending review or denied, additional information will be sent to support the outcome of this decision. Introducing [facility]! Dear Yrn Magallanes: 
Thank you for requesting a [employer] account. Our records indicate that you already have an active [employer] account. You can access your account anytime at [email or web address] [email or web address]/[employer] Did you know that you can access your hospital and ER discharge instructions at any time in [employer]? You can also review all of your test results from your hospital stay or ER visit. Additional Information If you have questions, please visit the Frequently Asked Questions section of the [employer] website at [email or web address] Remember, [employer] is NOT to be used for urgent needs. For medical emergencies, dial 911. Now available from your iPhone and Android! Please provide this summary of care documentation to your next provider. Your primary care clinician is listed as Jaleesa Bryant. If you have any questions after today's visit, please call [phone].

## 2017-11-10 NOTE — PROGRESS NOTES
Chief Complaint   Patient presents with    Diabetes    Medication Management    LOW BACK PAIN     Patient here for follow up on DM, meds and low back pain and labs.

## 2017-11-18 LAB
ALBUMIN SERPL-MCNC: 4.3 G/DL (ref 3.5–5.5)
ALBUMIN/GLOB SERPL: 1.7 {RATIO} (ref 1.2–2.2)
ALP SERPL-CCNC: 56 IU/L (ref 39–117)
ALT SERPL-CCNC: 56 IU/L (ref 0–44)
AST SERPL-CCNC: 31 IU/L (ref 0–40)
BILIRUB SERPL-MCNC: 0.2 MG/DL (ref 0–1.2)
BUN SERPL-MCNC: 8 MG/DL (ref 6–24)
BUN/CREAT SERPL: 10 (ref 9–20)
CALCIUM SERPL-MCNC: 9.5 MG/DL (ref 8.7–10.2)
CHLORIDE SERPL-SCNC: 101 MMOL/L (ref 96–106)
CO2 SERPL-SCNC: 25 MMOL/L (ref 18–29)
CREAT SERPL-MCNC: 0.77 MG/DL (ref 0.76–1.27)
GFR SERPLBLD CREATININE-BSD FMLA CKD-EPI: 108 ML/MIN/1.73
GFR SERPLBLD CREATININE-BSD FMLA CKD-EPI: 125 ML/MIN/1.73
GLOBULIN SER CALC-MCNC: 2.5 G/DL (ref 1.5–4.5)
GLUCOSE SERPL-MCNC: 115 MG/DL (ref 65–99)
HBA1C MFR BLD: 6.2 % (ref 4.8–5.6)
POTASSIUM SERPL-SCNC: 4.4 MMOL/L (ref 3.5–5.2)
PROT SERPL-MCNC: 6.8 G/DL (ref 6–8.5)
SODIUM SERPL-SCNC: 142 MMOL/L (ref 134–144)

## 2017-11-22 NOTE — PROGRESS NOTES
Tez Woodson is a 52 y.o. male    He have had multiple complains regularly. Wanting everything done. When sent to specialist, after negative work up he wants another referral for 2nd opinion. Please look at referral list.  He have been sent to Neuro, derm, GI, Rheum a couple of times, ENT, Orthopedics, etc... By me and a few other doctors. I was skylar with him and he smiled and agreed that he doesn't trust doctor. He goes home and read stuff online and if the doctor tells him differently he disagrees, wants referral and if that's not what he agrees to he ask for 2nd referral.  In the mean while he's not taking medications that doctors prescribed him because he disagrees. He gets very sensitive if I mentioned seeing Psych, says he's not crazy and there were no need, hence it's not been done by me, but it was mentioned a few times in the past.     He has various pain all over body that is negative on exam.  Non compliance with meds. Fibromyalgia/depression anxiety: last visit in May 2017 I started him on cymbalta (sent in lyrica and celexa as well incase his insurance says no), and he was taking cymbalta and admitted that it helped, hence i've not seen him in 6 months, but he ran out of cymbalta and never followed up. He's here today complaining of multiple pain again. Multiple joint pain everywhere, it just hurts, unsure where. Back pain without red flags. Palpitation, last visit we did EKG and it was normal. He wanted to be refer to Cardiology. I told him lets do holter monitor first, if there are identifying problems we'll refer him to cardio. DM2: on metformin, we'll get labs monitoring. We discussed that he should find another PCP doctor for second opinion, he agrees. Reviewed: active problem list, medication list, allergies, notes from last encounter, lab results, imaging    A comprehensive review of systems was negative except for that written in the HPI.  on 12 ROS.    No Known Allergies  Current Outpatient Prescriptions on File Prior to Visit   Medication Sig Dispense Refill    omeprazole (PRILOSEC) 20 mg capsule Take 1 Cap by mouth daily. 30 Cap 5    Lancets misc Check once daily 1 Each 11    glucose blood VI test strips (ASCENSIA AUTODISC VI, ONE TOUCH ULTRA TEST VI) strip Check once daily 50 Strip 5    tadalafil (CIALIS) 20 mg tablet Take 1 Tab by mouth as needed. 6 Tab 2     No current facility-administered medications on file prior to visit. Patient Active Problem List   Diagnosis Code    Obesity E66.9    Fatigue R53.83    abdominal pain LLQ R10.9    Bursitis of left shoulder M75.52    Tinnitus of both ears H93.13    Fatigue R53.83    Decreased libido R68.82    Elevated liver enzymes R74.8    Hypovitaminosis D E55.9    Right wrist pain M25.531    Dyslipidemia B84.8    Eosinophilic esophagitis Z07.5    Alarcon's esophagus without dysplasia K22.70    Diabetes mellitus without complication (HCC) U28.7    Arthralgia of multiple sites, bilateral M25.50    Acute right-sided low back pain without sciatica M54.5    Primary insomnia F51.01    Anxiety F41.9    Ear ringing H93.19    Erectile dysfunction N52.9    Encounter for immunization Z23    Screen for STD (sexually transmitted disease) Z11.3    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 Normal without focal findings. Cranial nerves 2-12 intact. Normal coordination and gait.   Mental status: Alert, oriented, thought content appropriate, affect: stable, mood-congruent. Head: Normocephalic, without obvious abnormality, atraumatic  Eyes: conjunctivae/corneas clear. PERRL, EOM's intact. Neck: supple, symmetrical, trachea midline, no JVD  Lungs: clear to auscultation bilaterally  Heart: regular rate and rhythm, S1, S2 normal, no murmur, click, rub or gallop  Abdomen: soft, non-tender. Extremities: extremities normal, atraumatic, no cyanosis or edema      Assessment/Plans:    Diagnoses and all orders for this visit:    1. Diabetes mellitus without complication (HCC)  -     METABOLIC PANEL, COMPREHENSIVE  -     HEMOGLOBIN A1C W/O EAG  -     metFORMIN (GLUCOPHAGE) 500 mg tablet; Take 1 Tab by mouth two (2) times daily (with meals). 2. Acute right-sided low back pain without sciatica  -     cyclobenzaprine (FLEXERIL) 10 mg tablet; Take 0.5 Tabs by mouth nightly as needed for Muscle Spasm(s). 3. Fibromyalgia  -     DULoxetine (CYMBALTA) 60 mg capsule; Take 1 Cap by mouth nightly. 4. Palpitation  -     REFERRAL TO CARDIAC ELECTROPHYSIOLOGY    5. Multiple joint pain  -     naproxen (NAPROSYN) 500 mg tablet; Take 1 Tab by mouth two (2) times daily (with meals). 6. Encounter for immunization  -     [place] IMMUNIZ ADMIN,1 SINGLE/COMB VAC/TOXOID  -     Pneumococcal polysaccharide vaccine, 23-valent, adult or immunosuppressed pt dose  -     Influenza virus vaccine (QUADRIVALENT PRES FREE SYRINGE) IM ([id number])    Discussed plans, risk/benefits of treatments/observations. Through the use of shared decision making, above plans were agreed upon. Medication compliance advised. Patient verbalized understanding. Follow-up Disposition:  Return for he will find another PCP.       Lorena Morrow MD  11/21/2017

## 2017-11-30 ENCOUNTER — TELEPHONE (OUTPATIENT)
Dept: FAMILY MEDICINE CLINIC | Age: 47
End: 2017-11-30

## 2017-11-30 NOTE — TELEPHONE ENCOUNTER
Returned call to pt read him his recent lab results. He wanted to know about his liver, told him per dr. Linda Zhang that it was a slight increase and doctor recheck on his next visit. He said ok thank you.

## 2017-11-30 NOTE — TELEPHONE ENCOUNTER
Pt would like a call with lab results at [phone]    Pt let him know they were mailed and he stated he doesn't live at that address anymore (since his 11/10/17 appt)     Updated in [place]

## 2017-12-08 ENCOUNTER — OFFICE VISIT (OUTPATIENT)
Dept: CARDIOLOGY CLINIC | Age: 47
End: 2017-12-08

## 2017-12-08 VITALS
HEART RATE: 72 BPM | WEIGHT: 206.2 LBS | HEIGHT: 68 IN | BODY MASS INDEX: 31.25 KG/M2 | OXYGEN SATURATION: 98 % | DIASTOLIC BLOOD PRESSURE: 92 MMHG | SYSTOLIC BLOOD PRESSURE: 146 MMHG

## 2017-12-08 DIAGNOSIS — R00.2 PALPITATIONS: Primary | ICD-10-CM

## 2017-12-08 DIAGNOSIS — E78.5 DYSLIPIDEMIA: ICD-10-CM

## 2017-12-08 DIAGNOSIS — E11.9 DIABETES MELLITUS WITHOUT COMPLICATION (HCC): ICD-10-CM

## 2017-12-08 DIAGNOSIS — R07.2 PRECORDIAL PAIN: ICD-10-CM

## 2017-12-08 NOTE — PATIENT INSTRUCTIONS
-- Please schedule your tests here at [facility]  -- Please make a 2 week follow up appointment to discuss results         Stress Echocardiogram: About This Test  What is it? An echocardiogram (also called an echo) uses sound waves to make an image of your heart. A device called a transducer is moved across your chest. It looks like a microphone. The transducer sends sound waves that echo off your heart and back to the transducer. These echoes are turned into moving pictures of your heart that can be seen on a video screen. In a stress echocardiogram, an echo is done while your heart is at rest and after your heart is made to work hard (stressed). You exercise to make your heart work hard. Sometimes, instead of exercise, a medicine is used that makes your heart respond like you have been exercising. Why is this test done? A stress echocardiogram is usually done to find out if you might have reduced blood flow to your heart. This is known as coronary artery disease. Reduced blood flow is easier to see when your heart is put under some form of stress. How can you prepare for the test?  · Do not smoke or eat a heavy meal before this test.  · Wear flat, comfortable shoes (no bedroom slippers) and loose, lightweight shorts or sweatpants. Walking or running shoes are best.  · Tell your doctor if:  Solange Sahu You are taking any medicines. ¨ You are taking medicine for an erection problem, such as sildenafil (Viagra), tadalafil (Cialis), and vardenafil (Levitra). You may need to take nitroglycerin during this test, which can cause a serious reaction if you have taken a medicine for an erection problem within the past 48 hours. ¨ You have had bleeding problems, or if you take aspirin or some other blood thinner. ¨ You have joint problems in your hips or legs that may make it hard for you to exercise. ¨ You have a heart valve problem.   What happens during the test?  You will first have an echocardiogram before exercising. This is called the baseline. You then exercise for a specific amount of time and then have another echocardiogram.  · You will take off all or most of your clothes and change into a gown. · You will lie on your back or on your left side on a bed or table. · You may receive medicine through a vein (intravenously, or IV). The IV can be used to give you a contrast material, which helps your doctor get good views of your heart. · Small pads or patches (electrodes) will be taped to your arms and legs to record your heart rate during the test.  · A small amount of gel will be rubbed on the left side of your chest to help  the sound waves. · The transducer will be pressed firmly against your chest and moved slowly back and forth. It is usually moved to different areas on your chest to get specific views of your heart. · You will be asked to do several things, such as hold very still, breathe in and out very slowly, hold your breath, or lie on your left side. This test usually takes 30 to 60 minutes. When the echocardiogram is finished, you will exercise and then have another echocardiogram. If you are not able to exercise, you may be given medicine that stimulates your heart to beat harder and faster, as if you were exercising. You most likely will either walk on a treadmill or pedal a stationary bicycle. While you exercise:  · Your heart rate and blood pressure are recorded. · You might be asked to use numbers to say how hard you are exercising. The higher the number, the harder you think you are exercising. · You will continue to exercise until you or your doctor feels you need to stop. · You will then lie on a bed or table, and another echocardiogram will be done. An exercise stress echocardiogram takes about 30 to 60 minutes.   What else should you know about the test?  · No electricity passes through your body during the test. There is no danger of getting an electrical shock.  · During the tests, tell your doctor if:  ¨ You have chest pain. ¨ You are very short of breath. ¨ You are lightheaded. ¨ You have other symptoms. What happens after the test?  · You will be able to sit or lie down and rest.  · Your heart rate and blood pressure will be checked for about 5 to 10 minutes. · You can go back to your usual activities right away. When should you call for help? Call 911 anytime you think you may need emergency care. For example, call if:  · You passed out (lost consciousness). · You have symptoms of a heart attack, such as:  ¨ Chest pain or pressure. ¨ Sweating. ¨ Shortness of breath. ¨ Nausea or vomiting. ¨ Pain that spreads from the chest to the neck, jaw, or one or both shoulders or arms. ¨ Dizziness or lightheadedness. ¨ A fast or uneven pulse. After calling 911, chew 1 adult-strength aspirin. Wait for an ambulance. Do not try to drive yourself. Watch closely for changes in your health, and be sure to contact your doctor if:  · You do not get better as expected. Follow-up care is a key part of your treatment and safety. Be sure to make and go to all appointments, and call your doctor if you are having problems. It's also a good idea to keep a list of the medicines you take. Ask your doctor when you can expect to have your test results. Where can you learn more? Go to http://rangel-nikos.info/. Enter S310 in the search box to learn more about \"Stress Echocardiogram: About This Test.\"  Current as of: September 21, 2016  Content Version: 11.4  © 9665-5117 [employer]. Care instructions adapted under license by [employer] (which disclaims liability or warranty for this information). If you have questions about a medical condition or this instruction, always ask your healthcare professional. [address] any warranty or liability for your use of this information.

## 2017-12-08 NOTE — MR AVS SNAPSHOT
Visit Information Date & Time Provider Department Dept. Phone Encounter #  
 12/8/2017 11:00 AM Jo Ann Jasso MD [facility] 95 [id number] Upcoming Health Maintenance Date Due  
 FOOT EXAM Q1 1/29/1980 EYE EXAM RETINAL OR DILATED Q1 1/29/1980 MICROALBUMIN Q1 3/18/2017 LIPID PANEL Q1 3/18/2017 HEMOGLOBIN A1C Q6M 5/17/2018 DTaP/Tdap/Td series (2 - Td) 11/20/2024 Allergies as of 12/8/2017  Review Complete On: 12/8/2017 By: Aaron Otero PA-C No Known Allergies Current Immunizations  Reviewed on 2/22/2016 Name Date Influenza Vaccine 11/3/2016, 11/20/2014  1:26 PM  
 Influenza Vaccine (Quad) PF 11/10/2017 Pneumococcal Polysaccharide (PPSV-23) 11/10/2017 Tdap 11/20/2014  4:06 PM  
  
 Not reviewed this visit You Were Diagnosed With   
  
 Codes Comments Palpitations    -  Primary ICD-10-CM: R00.2 ICD-9-CM: 785.1 Precordial pain     ICD-10-CM: R07.2 ICD-9-CM: 786.51 Dyslipidemia     ICD-10-CM: E78.5 ICD-9-CM: 272.4 Diabetes mellitus without complication ([facility]     ZLY-40-LY: E11.9 ICD-9-CM: 250.00 Vitals BP Pulse Height(growth percentile) Weight(growth percentile) SpO2 BMI  
 (!) 146/92 72 5' 8\" (1.727 m) 206 lb 3.2 oz (93.5 kg) 98% 31.35 kg/m2 Smoking Status Current Every Day Smoker Vitals History BMI and BSA Data Body Mass Index Body Surface Area  
 31.35 kg/m 2 2.12 m 2 Preferred Pharmacy Pharmacy Name Phone [email or web address], [address] [phone] Your Updated Medication List  
  
   
This list is accurate as of: 12/8/17 11:39 AM.  Always use your most recent med list.  
  
  
  
  
 cyclobenzaprine 10 mg tablet Commonly known as:  FLEXERIL Take 0.5 Tabs by mouth nightly as needed for Muscle Spasm(s). DULoxetine 60 mg capsule Commonly known as:  CYMBALTA Take 1 Cap by mouth nightly. glucose blood VI test strips strip Commonly known as:  ASCENSIA AUTODISC VI, ONE TOUCH ULTRA TEST VI Check once daily Lancets Misc Check once daily  
  
 metFORMIN 500 mg tablet Commonly known as:  GLUCOPHAGE Take 1 Tab by mouth two (2) times daily (with meals). naproxen 500 mg tablet Commonly known as:  NAPROSYN Take 1 Tab by mouth two (2) times daily (with meals). omeprazole 20 mg capsule Commonly known as:  PRILOSEC Take 1 Cap by mouth daily. tadalafil 20 mg tablet Commonly known as:  CIALIS Take 1 Tab by mouth as needed. We Performed the Following AMB POC EKG ROUTINE W/ 12 LEADS, INTER & REP [26037 CPT(R)] To-Do List   
 12/08/2017 ECHO:  2D ECHO COMPLETE ADULT (TTE) W OR WO CONTR   
  
 12/09/2017 ECHO:  ECHO TTE STRESS EXRCSE COMP W OR WO CONTR Patient Instructions -- Please schedule your tests here at [facility] 
-- Please make a 2 week follow up appointment to discuss results Stress Echocardiogram: About This Test 
What is it? An echocardiogram (also called an echo) uses sound waves to make an image of your heart. A device called a transducer is moved across your chest. It looks like a microphone. The transducer sends sound waves that echo off your heart and back to the transducer. These echoes are turned into moving pictures of your heart that can be seen on a video screen. In a stress echocardiogram, an echo is done while your heart is at rest and after your heart is made to work hard (stressed). You exercise to make your heart work hard. Sometimes, instead of exercise, a medicine is used that makes your heart respond like you have been exercising. Why is this test done? A stress echocardiogram is usually done to find out if you might have reduced blood flow to your heart. This is known as coronary artery disease. Reduced blood flow is easier to see when your heart is put under some form of stress. How can you prepare for the test? 
· Do not smoke or eat a heavy meal before this test. 
· Wear flat, comfortable shoes (no bedroom slippers) and loose, lightweight shorts or sweatpants. Walking or running shoes are best. 
· Tell your doctor if: 
Dena Rakel You are taking any medicines. ¨ You are taking medicine for an erection problem, such as sildenafil (Viagra), tadalafil (Cialis), and vardenafil (Levitra). You may need to take nitroglycerin during this test, which can cause a serious reaction if you have taken a medicine for an erection problem within the past 48 hours. ¨ You have had bleeding problems, or if you take aspirin or some other blood thinner. ¨ You have joint problems in your hips or legs that may make it hard for you to exercise. ¨ You have a heart valve problem. What happens during the test? 
You will first have an echocardiogram before exercising. This is called the baseline. You then exercise for a specific amount of time and then have another echocardiogram. 
· You will take off all or most of your clothes and change into a gown. · You will lie on your back or on your left side on a bed or table. · You may receive medicine through a vein (intravenously, or IV). The IV can be used to give you a contrast material, which helps your doctor get good views of your heart. · Small pads or patches (electrodes) will be taped to your arms and legs to record your heart rate during the test. 
· A small amount of gel will be rubbed on the left side of your chest to help  the sound waves. · The transducer will be pressed firmly against your chest and moved slowly back and forth. It is usually moved to different areas on your chest to get specific views of your heart. · You will be asked to do several things, such as hold very still, breathe in and out very slowly, hold your breath, or lie on your left side. This test usually takes 30 to 60 minutes. When the echocardiogram is finished, you will exercise and then have another echocardiogram. If you are not able to exercise, you may be given medicine that stimulates your heart to beat harder and faster, as if you were exercising. You most likely will either walk on a treadmill or pedal a stationary bicycle. While you exercise: 
· Your heart rate and blood pressure are recorded. · You might be asked to use numbers to say how hard you are exercising. The higher the number, the harder you think you are exercising. · You will continue to exercise until you or your doctor feels you need to stop. · You will then lie on a bed or table, and another echocardiogram will be done. An exercise stress echocardiogram takes about 30 to 60 minutes. What else should you know about the test? 
· No electricity passes through your body during the test. There is no danger of getting an electrical shock. · During the tests, tell your doctor if: 
¨ You have chest pain. ¨ You are very short of breath. ¨ You are lightheaded. ¨ You have other symptoms. What happens after the test? 
· You will be able to sit or lie down and rest. 
· Your heart rate and blood pressure will be checked for about 5 to 10 minutes. · You can go back to your usual activities right away. When should you call for help? Call 911 anytime you think you may need emergency care. For example, call if: 
· You passed out (lost consciousness). · You have symptoms of a heart attack, such as: ¨ Chest pain or pressure. ¨ Sweating. ¨ Shortness of breath. ¨ Nausea or vomiting. ¨ Pain that spreads from the chest to the neck, jaw, or one or both shoulders or arms. ¨ Dizziness or lightheadedness. ¨ A fast or uneven pulse. After calling 911, chew 1 adult-strength aspirin. Wait for an ambulance. Do not try to drive yourself. Watch closely for changes in your health, and be sure to contact your doctor if: · You do not get better as expected. Follow-up care is a key part of your treatment and safety. Be sure to make and go to all appointments, and call your doctor if you are having problems. It's also a good idea to keep a list of the medicines you take. Ask your doctor when you can expect to have your test results. Where can you learn more? Go to http://rangel-nikos.info/. Enter W146 in the search box to learn more about \"Stress Echocardiogram: About This Test.\" Current as of: September 21, 2016 Content Version: 11.4 © 6872-8924 [employer]. Care instructions adapted under license by [employer] (which disclaims liability or warranty for this information). If you have questions about a medical condition or this instruction, always ask your healthcare professional. [address] any warranty or liability for your use of this information. Introducing [facility]! Dear Patrica Bernal: 
Thank you for requesting a Frankly account. Our records indicate that you already have an active Frankly account. You can access your account anytime at [email or web address] Did you know that you can access your hospital and ER discharge instructions at any time in Frankly? You can also review all of your test results from your hospital stay or ER visit. Additional Information If you have questions, please visit the Frequently Asked Questions section of the Frankly website at [email or web address] Remember, Frankly is NOT to be used for urgent needs. For medical emergencies, dial 911. Now available from your iPhone and Android! Please provide this summary of care documentation to your next provider. Your primary care clinician is listed as Jaleesa Bryant. If you have any questions after today's visit, please call [phone].

## 2017-12-08 NOTE — PROGRESS NOTES
[facility]   [address] [facility], [address] [place] [address]                                          NEW PATIENT HPI/FOLLOW-UP      NAME:  Marianne Jimenez   :   1970   MRN:   [id number]   PCP:  Talisha Courtney MD           Subjective: The patient is a 52y.o. year old male is very concerned about his heart. His PMHx includes: DM, dyslipidemia, current tobacco use, chronic fatigue, chronic pain, hypochondriacal disorder, obesity, tinnitus, and a h/o non-compliance. He is self-referred to cardiology today and is c/o palpitations and left sided chest pain. His sx started about 3 months ago with a pronounced episode of palpitations. Palpitations have not recurred but he has had left sided chest pain, non-radiating and lasting a few minutes. Pain is pressure or \"twinge\" like sensation. He is unable to connect it to exertion or rest, stating simply: \"it just happens. \". He does not associated with SOB, stating:\"I always have SOB. \"    Pain resolves without intervention. He is also c/o \"hearing fluid around my heart when I lay down at night. \" States it sounds like the ocean. Denies change in exercise tolerance edema, medication intolerance, PND/orthopnea, wheezing, sputum, syncope, dizziness or light headedness. Doing satisfactorily. Review of Systems  General: Pt denies excessive weight gain or loss. Pt is able to conduct ADL's. Respiratory: +shortness of breath, JAMESON, Denies wheezing or stridor.   Cardiovascular: +precordial pain, palpitations, Denies edema or PND  Gastrointestinal: Denies poor appetite, indigestion, abdominal pain or blood in stool  Peripheral vascular: Denies claudication, leg cramps  Neuropsychiatric: Denies paresthesias,tingling,numbness,anxiety,depression,fatigue  Musculoskeletal: Denies pain,tenderness, soreness,swelling      Past Medical History:   Diagnosis Date    Acute right-sided low back pain without sciatica 2016    Anxiety 2016  Chronic pain     Contact dermatitis and other eczema, due to unspecified cause     hives    Diabetes mellitus without complication ([address] 34/0/9297    Diabetes mellitus without complication ([address] 62/5/1372    Dyslipidemia 12/2/2014    Ear ringing 11/4/2016    Encounter for immunization 11/4/2016    Erectile dysfunction 11/4/2016    Herniated disc     Hypertension     Hypochondriacal disorder 5/22/2017    Hypovitaminosis D 12/2/2014    Multiple joint pain 11/4/2016    Noncompliance 5/22/2017    Noncompliance 5/22/2017    Obesity     Primary insomnia 11/4/2016    Right wrist pain 12/2/2014    Screen for STD (sexually transmitted disease) 11/4/2016     Patient Active Problem List    Diagnosis Date Noted    Noncompliance 05/22/2017    Hypochondriacal disorder 05/22/2017    DDD (degenerative disc disease), lumbar 03/08/2017    Alcohol induced liver disorder ([address] 12/05/2016    Arthralgia of multiple sites, bilateral 11/04/2016    Acute right-sided low back pain without sciatica 11/04/2016    Primary insomnia 11/04/2016    Anxiety 11/04/2016    Ear ringing 11/04/2016    Erectile dysfunction 11/04/2016    Encounter for immunization 11/04/2016    Screen for STD (sexually transmitted disease) 11/04/2016    Diabetes mellitus without complication ([address] 60/91/8917    Eosinophilic esophagitis 75/00/4439    Alarcon's esophagus without dysplasia 09/13/2016    Hypovitaminosis D 12/02/2014    Right wrist pain 12/02/2014    Dyslipidemia 12/02/2014    Elevated liver enzymes 12/14/2012    Decreased libido 09/28/2012    Fatigue 02/10/2012    Tinnitus of both ears 12/23/2011    Bursitis of left shoulder 06/13/2011    Obesity 05/16/2011    Fatigue 05/16/2011    abdominal pain LLQ 05/16/2011      Past Surgical History:   Procedure Laterality Date    HX ORTHOPAEDIC      lumbar steroid injection for pain management     No Known Allergies   Family History   Problem Relation Age of Onset    Colon Polyps Father       Social History     Social History    Marital status:      Spouse name: N/A    Number of children: 2    Years of education: N/A     Occupational History     Not Employed     Social History Main Topics    Smoking status: Current Every Day Smoker     Years: 4.00     Types: Cigarettes     Last attempt to quit: 10/1/2014    Smokeless tobacco: Never Used    Alcohol use 5.0 oz/week     10 Cans of beer per week      Comment: rarely     Drug use: No    Sexual activity: Yes     Partners: Female     Birth control/ protection: None     Other Topics Concern    Not on file     Social History Narrative      Current Outpatient Prescriptions   Medication Sig    cyclobenzaprine (FLEXERIL) 10 mg tablet Take 0.5 Tabs by mouth nightly as needed for Muscle Spasm(s).  metFORMIN (GLUCOPHAGE) 500 mg tablet Take 1 Tab by mouth two (2) times daily (with meals).  DULoxetine (CYMBALTA) 60 mg capsule Take 1 Cap by mouth nightly.  naproxen (NAPROSYN) 500 mg tablet Take 1 Tab by mouth two (2) times daily (with meals).  omeprazole (PRILOSEC) 20 mg capsule Take 1 Cap by mouth daily. (Patient taking differently: Take 20 mg by mouth as needed.)    Lancets misc Check once daily    glucose blood VI test strips (ASCENSIA AUTODISC VI, ONE TOUCH ULTRA TEST VI) strip Check once daily    tadalafil (CIALIS) 20 mg tablet Take 1 Tab by mouth as needed. No current facility-administered medications for this visit. I have reviewed the MAs notes, vitals, problem list, allergy list, medical history, family medical, social history and medications. Objective:     Physical Exam:     Vitals:    12/08/17 1036   BP: (!) 146/92   Pulse: 72   SpO2: 98%   Weight: 206 lb 3.2 oz (93.5 kg)   Height: 5' 8\" (1.727 m)    Body mass index is 31.35 kg/(m^2). General: WDWN adult male, in no acute distress. Pleasant affect. HEENT: No carotid bruits, no JVD, trach is midline.    Heart:  Normal S1/S2 negative S3 or S4. Regular, no appreciable murmur, gallop or rub.   Respiratory: Clear bilaterally, no wheezing or rales  Abdomen:   Soft, non-tender, bowel sounds are active.   Extremities:  No edema, normal cap refill, no cyanosis. Neuro: A&Ox3, speech clear, gait stable. Skin: Skin color is normal. No rashes or lesions. No diaphoresis. Vascular: 2+ pulses symmetric in all extremities        Data Review:       Cardiographics:    EKG interpretation:  Rhythm: normal sinus rhythm; and regular . Rate (approx.): 66; Axis: normal; P wave: normal; QRS interval: normal ; ST/T wave: normal; This EKG was interpreted by Noel Pratt PA-C.         Cardiology Labs:    Results for orders placed or performed during the hospital encounter of 08/20/14   EKG, 12 LEAD, INITIAL   Result Value Ref Range    Ventricular Rate 85 BPM    Atrial Rate 85 BPM    P-R Interval 158 ms    QRS Duration 92 ms    Q-T Interval 364 ms    QTC Calculation (Bezet) 433 ms    Calculated P Axis 62 degrees    Calculated R Axis 66 degrees    Calculated T Axis 31 degrees    Diagnosis       Normal sinus rhythm  Normal ECG  When compared with ECG of 25-JAN-2012 22:10,  No significant change was found  Confirmed by Deni Garcia MD, Miguel Devi ([id number]) on 8/21/2014 7:40:28 AM       Lab Results   Component Value Date/Time    Cholesterol, total 118 03/18/2016 08:54 AM    HDL Cholesterol 48 03/18/2016 08:54 AM    LDL, calculated 59 03/18/2016 08:54 AM    Triglyceride 56 03/18/2016 08:54 AM       Lab Results   Component Value Date/Time    Sodium 142 11/17/2017 04:13 PM    Potassium 4.4 11/17/2017 04:13 PM    Chloride 101 11/17/2017 04:13 PM    CO2 25 11/17/2017 04:13 PM    Anion gap 7 10/31/2012 09:35 AM    Glucose 115 11/17/2017 04:13 PM    BUN 8 11/17/2017 04:13 PM    Creatinine 0.77 11/17/2017 04:13 PM    BUN/Creatinine ratio 10 11/17/2017 04:13 PM    GFR est  11/17/2017 04:13 PM    GFR est non- 11/17/2017 04:13 PM    Calcium 9.5 11/17/2017 04:13 PM Bilirubin, total 0.2 11/17/2017 04:13 PM    AST (SGOT) 31 11/17/2017 04:13 PM    Alk. phosphatase 56 11/17/2017 04:13 PM    Protein, total 6.8 11/17/2017 04:13 PM    Albumin 4.3 11/17/2017 04:13 PM    Globulin 3.3 10/31/2012 09:35 AM    A-G Ratio 1.7 11/17/2017 04:13 PM    ALT (SGPT) 56 11/17/2017 04:13 PM          Assessment:       ICD-10-CM ICD-9-CM    1. Palpitations R00.2 785.1 AMB POC EKG ROUTINE W/ 12 LEADS, INTER & REP         Discussion: Patient presents at this time stable from a cardiac perspective. BP was moderately high; without diagnosis for HTN. Most recent PCP office visit note was FULLY REVIEWED. Pt appears to have a health anxiety and perhaps hypochondriacal disorder confounded by non-compliance, reliance on internet health information and has been dismissive of dealing with anxiety or psychological disorder affecting his care. EKGs have been without ectopic beats or ischemia. However, pt does have some risk factors for CAD including: DM, dyslipidemia, male gender, tobacco use and likely an undiagnosed hypertension. Given his c/o chest pain, SOB and palpitations, we may be able to allay his concerns with echo to fully evaluate anatomy followed by stress echo to evaluate for WMA or coronary insufficiency. After ruling out CAD, the goal would be to offer reassurance. Pt's description of subjection auditory bruit was not supported by exam.     Discussed/seen with Dr. Agnieszka Stephenson: 1. Continue same meds. -- Echo   -- Stress Echo    2. Lipid profile and labs followed by PCP. 3.Follow up: 2 weeks to discuss test results   --  Call with questions or concerns. I have discussed the diagnosis with the patient and the intended plan as seen in the above orders. The patient has received an after-visit summary and questions were answered concerning future plans. I have discussed any concerning medication side effects and warnings with the patient as well.     Laura Anna, ROSEMARY  12/8/2017

## 2017-12-14 ENCOUNTER — HOSPITAL ENCOUNTER (OUTPATIENT)
Dept: NON INVASIVE DIAGNOSTICS | Age: 47
Discharge: HOME | End: 2017-12-14
Attending: PHYSICIAN ASSISTANT
Payer: COMMERCIAL

## 2017-12-14 DIAGNOSIS — R07.2 PRECORDIAL PAIN: ICD-10-CM

## 2017-12-14 DIAGNOSIS — R00.2 PALPITATIONS: ICD-10-CM

## 2017-12-14 LAB
ATTENDING PHYSICIAN, CST07: NORMAL
DIAGNOSIS, 93000: NORMAL
DUKE TM SCORE RESULT, CST14: NORMAL
DUKE TREADMILL SCORE, CST13: NORMAL
ECG INTERP BEFORE EX, CST11: NORMAL
ECG INTERP DURING EX, CST12: NORMAL
FUNCTIONAL CAPACITY, CST17: NORMAL
KNOWN CARDIAC CONDITION, CST08: NORMAL
MAX. DIASTOLIC BP, CST04: 100 MMHG
MAX. HEART RATE, CST05: 157 BPM
MAX. SYSTOLIC BP, CST03: 198 MMHG
OVERALL BP RESPONSE TO EXERCISE, CST16: NORMAL
OVERALL HR RESPONSE TO EXERCISE, CST15: NORMAL
PEAK EX METS, CST10: 13.4 METS
PROTOCOL NAME, CST01: NORMAL
TEST INDICATION, CST09: NORMAL

## 2017-12-14 PROCEDURE — 93306 TTE W/DOPPLER COMPLETE: CPT

## 2017-12-14 PROCEDURE — 93351 STRESS TTE COMPLETE: CPT

## 2017-12-15 ENCOUNTER — TELEPHONE (OUTPATIENT)
Dept: CARDIOLOGY CLINIC | Age: 47
End: 2017-12-15

## 2017-12-15 NOTE — TELEPHONE ENCOUNTER
Outgoing call to patient ricki Franklin PA-C to notify him that his Echo and Stress Echo results are normal, and his chest pain is not related to his heart. Patient was informed that he's welcome to come back in and discuss results, otherwise, his test results were normal and no further work up is recommended. Patient was informed he should continue with his PCP, and that we do not need to follow up with him at this time. Patient voiced understanding.

## 2017-12-27 NOTE — PROGRESS NOTES
Ambulatory cardiology attending physician note:    I participated in the interview and examination of this patient with [address] Mr. Elina Yousif is an anxious 42-year-old male with atypical chest pain in a pattern that is not suggestive of cardiac disease. However, he has a separate complaint of palpitations which can be investigated with a patient triggered event recording device. The planned workup is a synthesis of our discussion of this patient after my evaluation.     Abbey Tse MD

## 2020-03-10 ENCOUNTER — HOSPITAL ENCOUNTER (EMERGENCY)
Age: 50
Discharge: HOME | End: 2020-03-10
Attending: EMERGENCY MEDICINE
Payer: COMMERCIAL

## 2020-03-10 ENCOUNTER — APPOINTMENT (OUTPATIENT)
Dept: CT IMAGING | Age: 50
End: 2020-03-10
Attending: EMERGENCY MEDICINE
Payer: COMMERCIAL

## 2020-03-10 VITALS
HEIGHT: 68 IN | WEIGHT: 208.34 LBS | BODY MASS INDEX: 31.57 KG/M2 | OXYGEN SATURATION: 98 % | RESPIRATION RATE: 18 BRPM | TEMPERATURE: 98.1 F | HEART RATE: 84 BPM | DIASTOLIC BLOOD PRESSURE: 85 MMHG | SYSTOLIC BLOOD PRESSURE: 132 MMHG

## 2020-03-10 DIAGNOSIS — H93.19 TINNITUS, UNSPECIFIED LATERALITY: Primary | ICD-10-CM

## 2020-03-10 DIAGNOSIS — G89.29 CHRONIC LOW BACK PAIN WITHOUT SCIATICA, UNSPECIFIED BACK PAIN LATERALITY: ICD-10-CM

## 2020-03-10 DIAGNOSIS — R20.0 NUMBNESS AND TINGLING: ICD-10-CM

## 2020-03-10 DIAGNOSIS — R20.2 NUMBNESS AND TINGLING: ICD-10-CM

## 2020-03-10 DIAGNOSIS — M54.50 CHRONIC LOW BACK PAIN WITHOUT SCIATICA, UNSPECIFIED BACK PAIN LATERALITY: ICD-10-CM

## 2020-03-10 LAB
GLUCOSE BLD STRIP.AUTO-MCNC: 145 MG/DL (ref 65–100)
SERVICE CMNT-IMP: ABNORMAL

## 2020-03-10 PROCEDURE — 82962 GLUCOSE BLOOD TEST: CPT

## 2020-03-10 PROCEDURE — 99283 EMERGENCY DEPT VISIT LOW MDM: CPT

## 2020-03-10 PROCEDURE — 70450 CT HEAD/BRAIN W/O DYE: CPT

## 2020-03-10 NOTE — ED PROVIDER NOTES
EMERGENCY DEPARTMENT HISTORY AND PHYSICAL EXAM      Date: 3/10/2020  Patient Name: Lyndsey Gomez    History of Presenting Illness     Chief Complaint   Patient presents with    Back Pain     Reports lower back pain x 3 months.  Tingling     Reports N/T in back of head x 3 months. History Provided By: Patient    HPI: Lyndsey Gomez, 48 y.o. male with PMHx as noted below presents the emergency department with multiple complaints. Patient reports having chronic lower back pain, tingling in his fingers, tenderness and numbness on his scalp for the last 3 to 4 months. Symptoms have been constant without relieving or exacerbating factors. Patient states he has been seen by his primary care physician for this several times and told it may be related to his diabetes. Patient stated that he decided to come to the emergency department tonight for a second opinion. Otherwise denies any unilateral neurologic deficits, visual changes, fevers, chills, nausea, vomiting, chest pain. PCP: Johnathan Collazo MD    Current Outpatient Medications   Medication Sig Dispense Refill    cyclobenzaprine (FLEXERIL) 10 mg tablet Take 0.5 Tabs by mouth nightly as needed for Muscle Spasm(s). 20 Tab 0    metFORMIN (GLUCOPHAGE) 500 mg tablet Take 1 Tab by mouth two (2) times daily (with meals). 60 Tab 2    DULoxetine (CYMBALTA) 60 mg capsule Take 1 Cap by mouth nightly. 30 Cap 2    naproxen (NAPROSYN) 500 mg tablet Take 1 Tab by mouth two (2) times daily (with meals). 30 Tab 3    omeprazole (PRILOSEC) 20 mg capsule Take 1 Cap by mouth daily. (Patient taking differently: Take 20 mg by mouth as needed.) 30 Cap 5    tadalafil (CIALIS) 20 mg tablet Take 1 Tab by mouth as needed.  6 Tab 2    Lancets misc Check once daily 1 Each 11    glucose blood VI test strips (ASCENSIA AUTODISC VI, ONE TOUCH ULTRA TEST VI) strip Check once daily 50 Strip 5       Past History     Past Medical History:  Past Medical History:   Diagnosis Date    Acute right-sided low back pain without sciatica 2016    Anxiety 2016    Chronic pain     Contact dermatitis and other eczema, due to unspecified cause     hives    Diabetes mellitus without complication ([facility] 716    Diabetes mellitus without complication ([facility] 1514    Dyslipidemia 2014    Ear ringing 2016    Encounter for immunization 2016    Erectile dysfunction 2016    Herniated disc     Hypertension     Hypochondriacal disorder 2017    Hypovitaminosis D 2014    Multiple joint pain 2016    Noncompliance 2017    Noncompliance 2017    Obesity     Primary insomnia 2016    Right wrist pain 2014    Screen for STD (sexually transmitted disease) 2016       Past Surgical History:  Past Surgical History:   Procedure Laterality Date    HX ORTHOPAEDIC      lumbar steroid injection for pain management       Family History:  Family History   Problem Relation Age of Onset    Colon Polyps Father        Social History:  Social History     Tobacco Use    Smoking status: Current Every Day Smoker     Years: 4.00     Types: Cigarettes     Last attempt to quit: 10/1/2014     Years since quittin.4    Smokeless tobacco: Never Used   Substance Use Topics    Alcohol use: Yes     Alcohol/week: 8.3 standard drinks     Types: 10 Cans of beer per week     Comment: rarely     Drug use: No       Allergies:  No Known Allergies      Review of Systems   Review of Systems  Constitutional: Negative for fever, chills, and fatigue. HENT: Negative for congestion, sore throat, rhinorrhea, sneezing and neck stiffness   Eyes: Negative for discharge and redness. Respiratory: Negative for  shortness of breath, wheezing   Cardiovascular: Negative for chest pain, palpitations   Gastrointestinal: Negative for nausea, vomiting, abdominal pain, constipation, diarrhea and blood in stool.    Genitourinary: Negative for dysuria, hematuria, flank pain, decreased urine volume, discharge,   Musculoskeletal: Negative for myalgias or joint pain . Skin: Negative for rash or lesions . Neurological: Negative weakness, light-headedness,and headaches. Positive numbness      Physical Exam   Physical Exam    GENERAL: alert and oriented, no acute distress  EYES: PEERL, No injection, discharge or icterus. ENT: Mucous membranes pink and moist.  TMs clear bilaterally, no abnormalities noted in the external auditory canal  NECK: Supple  LUNGS: Airway patent. Non-labored respirations. Breath sounds clear with good air entry bilaterally. HEART: Regular rate and rhythm. No peripheral edema  ABDOMEN: Non-distended and non-tender, without guarding or rebound. SKIN:  warm, dry  MSK/EXTREMITIES: Without swelling, tenderness or deformity, symmetric with normal ROM. There is no midline spinal tenderness  NEUROLOGICAL:  alert and oriented x 3, CN II-XII grossly intact, strength 5/5 bilateral upper and lower extremities, sensation intact throughout to light touch, no dysmetria or ataxia noted        Diagnostic Study Results     Labs -     Recent Results (from the past 12 hour(s))   GLUCOSE, POC    Collection Time: 03/10/20  8:50 PM   Result Value Ref Range    Glucose (POC) 145 (H) 65 - 100 mg/dL    Performed by [facility]        Radiologic Studies -   CT HEAD WO CONT   Final Result   IMPRESSION: No acute intracranial process. CT Results  (Last 48 hours)               03/10/20 2012  CT HEAD WO CONT Final result    Impression:  IMPRESSION: No acute intracranial process. Narrative:  EXAM: CT HEAD WO CONT   History: Tinnitus and headache numbness   INDICATION: tinnitus, head numbness       COMPARISON: None. CONTRAST: None. TECHNIQUE: Unenhanced CT of the head was performed using 5 mm images. Brain and   bone windows were generated.   CT dose reduction was achieved through use of a   standardized protocol tailored for this examination and automatic exposure   control for dose modulation. FINDINGS:   The ventricles and sulci are normal in size, shape and configuration and   midline. There is no significant white matter disease. There is no intracranial   hemorrhage, extra-axial collection, mass, mass effect or midline shift. The   basilar cisterns are open. No acute infarct is identified. The bone windows   demonstrate no abnormalities. Mucous retention cyst in the maxillary sinus. .               CXR Results  (Last 48 hours)    None            Medical Decision Making   I am the first provider for this patient. I reviewed the vital signs, available nursing notes, past medical history, past surgical history, family history and social history. Vital Signs-Reviewed the patient's vital signs. Patient Vitals for the past 12 hrs:   Temp Pulse Resp BP SpO2   03/10/20 1735 98.1 °F (36.7 °C) 84 18 132/85 98 %       Records Reviewed: Nursing Notes and Old Medical Records    Provider Notes (Medical Decision Making): On presentation, the patient is well appearing, in no acute distress with normal vital signs. Patient presenting with multiple chronic complaints including bilateral hand numbness, scalp numbness, tinnitus, chronic lower back pain. Based on my history and exam the differential diagnosis for this patient includes diabetic neuropathy, chronic pain, intracranial mass, electrolyte abnormalities, metabolic abnormalities. CT scan showed no acute pathology. Patient with no focal neurologic deficits on exam so unlikely to be CVA. Recommended obtaining basic blood work however the patient refused and stated that he did not want a waiting longer would like to go home. Given the chronicity of his symptoms doubt any imminent life-threatening conditions present so will discharge home. Patient is aware that our diagnosis here today is limited given his refusal of work-up. ED Course:   Initial assessment performed.  The patients presenting problems have been discussed, and they are in agreement with the care plan formulated and outlined with them. I have encouraged them to ask questions as they arise throughout their visit. PROGRESS NOTE:  The patient has been re-evaluated and is ready for discharge. Reviewed available results with patient and have counseled them on diagnosis and care plan. They have expressed understanding, and all their questions have been answered. They agree with plan and agree to have pt F/U as recommended, or return to the ED if their sxs worsen. Discharge instructions have been provided and explained to them, along with reasons to have pt return to the ED. The patient is amenable to discharge so will discharge pt at this time      Disposition:  home    PLAN:  1. Discharge Medication List as of 3/10/2020  8:35 PM        2. Follow-up Information     Follow up With Specialties Details Why Contact Info    Duke Diaz MD [facility] Practice Schedule an appointment as soon as possible for a visit in 2 days  [facility] [address] [facility]  [phone]      [facility] EMERGENCY DEPT Emergency Medicine  If symptoms worsen [address] [id number] [facility]  [phone]    [facility] Schedule an appointment as soon as possible for a visit in 2 days  [address] [facility] [address] Tomasita Nutting Neurology [facility] Neurology Schedule an appointment as soon as possible for a visit in 2 days  [address] [facility]  [phone]        Return to ED if worse     Diagnosis     Clinical Impression:   1. Tinnitus, unspecified laterality    2. Numbness and tingling    3. Chronic low back pain without sciatica, unspecified back pain laterality        Please note that this dictation was completed with Dragon, computer voice recognition software.   Quite often unanticipated grammatical, syntax, homophones, and other interpretive errors are inadvertently transcribed by the computer software. Please disregard these errors. Additionally, please excuse any errors that have escaped final proofreading.

## 2020-03-11 NOTE — DISCHARGE INSTRUCTIONS
Patient Education        Learning About Relief for Back Pain  What is back tension and strain? Back strain happens when you overstretch, or pull, a muscle in your back. You may hurt your back in an accident or when you exercise or lift something. Most back pain will get better with rest and time. You can take care of yourself at home to help your back heal.  What can you do first to relieve back pain? When you first feel back pain, try these steps:  · Walk. Take a short walk (10 to 20 minutes) on a level surface (no slopes, hills, or stairs) every 2 to 3 hours. Walk only distances you can manage without pain, especially leg pain. · Relax. Find a comfortable position for rest. Some people are comfortable on the floor or a medium-firm bed with a small pillow under their head and another under their knees. Some people prefer to lie on their side with a pillow between their knees. Don't stay in one position for too long. · Try heat or ice. Try using a heating pad on a low or medium setting, or take a warm shower, for 15 to 20 minutes every 2 to 3 hours. Or you can buy single-use heat wraps that last up to 8 hours. You can also try an ice pack for 10 to 15 minutes every 2 to 3 hours. You can use an ice pack or a bag of frozen vegetables wrapped in a thin towel. There is not strong evidence that either heat or ice will help, but you can try them to see if they help. You may also want to try switching between heat and cold. · Take pain medicine exactly as directed. ¨ If the doctor gave you a prescription medicine for pain, take it as prescribed. ¨ If you are not taking a prescription pain medicine, ask your doctor if you can take an over-the-counter medicine. What else can you do? · Stretch and exercise. Exercises that increase flexibility may relieve your pain and make it easier for your muscles to keep your spine in a good, neutral position. And don't forget to keep walking. · Do self-massage.  You can use self-massage to unwind after work or school or to energize yourself in the morning. You can easily massage your feet, hands, or neck. Self-massage works best if you are in comfortable clothes and are sitting or lying in a comfortable position. Use oil or lotion to massage bare skin. · Reduce stress. Back pain can lead to a vicious [place]: Distress about the pain tenses the muscles in your back, which in turn causes more pain. Learn how to relax your mind and your muscles to lower your stress. Where can you learn more? Go to http://rangelModern Family Doctornikos.info/. Enter B820 in the search box to learn more about \"Learning About Relief for Back Pain. \"  Current as of: March 21, 2017  Content Version: 11.5  © 2734-9974 [employer]. Care instructions adapted under license by [employer] (which disclaims liability or warranty for this information). If you have questions about a medical condition or this instruction, always ask your healthcare professional. [place] [address] any warranty or liability for your use of this information. Patient Education        Back Pain, Emergency or Urgent Symptoms: Care Instructions  Your Care Instructions    Many people have back pain at one time or another. In most cases, pain gets better with self-care that includes over-the-counter pain medicine, ice, heat, and exercises. Unless you have symptoms of a severe injury or heart attack, you may be able to give yourself a few days before you call a doctor. But some back problems are very serious. Do not ignore symptoms that need to be checked right away. Follow-up care is a key part of your treatment and safety. Be sure to make and go to all appointments, and call your doctor if you are having problems. It's also a good idea to know your test results and keep a list of the medicines you take. How can you care for yourself at home?   · Sit or lie in positions that are most comfortable and that reduce your pain. Try one of these positions when you lie down:  ? Lie on your back with your knees bent and supported by large pillows. ? Lie on the floor with your legs on the seat of a sofa or chair. ? Lie on your side with your knees and hips bent and a pillow between your legs. ? Lie on your stomach if it does not make pain worse. · Do not sit up in bed, and avoid soft couches and twisted positions. Bed rest can help relieve pain at first, but it delays healing. Avoid bed rest after the first day. · Change positions every 30 minutes. If you must sit for long periods of time, take breaks from sitting. Get up and walk around, or lie flat. · Try using a heating pad on a low or medium setting, for 15 to 20 minutes every 2 or 3 hours. Try a warm shower in place of one session with the heating pad. You can also buy single-use heat wraps that last up to 8 hours. You can also try ice or cold packs on your back for 10 to 20 minutes at a time, several times a day. (Put a thin cloth between the ice pack and your skin.) This reduces pain and makes it easier to be active and exercise. · Take pain medicines exactly as directed. ? If the doctor gave you a prescription medicine for pain, take it as prescribed. ? If you are not taking a prescription pain medicine, ask your doctor if you can take an over-the-counter medicine. When should you call for help? Call 911 anytime you think you may need emergency care. For example, call if:    · You are unable to move a leg at all.     · You have back pain with severe belly pain.     · You have symptoms of a heart attack. These may include:  ? Chest pain or pressure, or a strange feeling in the chest.  ? Sweating. ? Shortness of breath. ? Nausea or vomiting. ? Pain, pressure, or a strange feeling in the back, neck, jaw, or upper belly or in one or both shoulders or arms. ? Lightheadedness or sudden weakness. ? A fast or irregular heartbeat.   After you call 911, the  may tell you to chew 1 adult-strength or 2 to 4 low-dose aspirin. Wait for an ambulance. Do not try to drive yourself.    Call your doctor now or seek immediate medical care if:    · You have new or worse symptoms in your arms, legs, chest, belly, or buttocks. Symptoms may include:  ? Numbness or tingling. ? Weakness. ? Pain.     · You lose bladder or bowel control.     · You have back pain and:  ? You have injured your back while lifting or doing some other activity. Call if the pain is severe, has not gone away after 1 or 2 days, and you cannot do your normal daily activities. ? You have had a back injury before that needed treatment. ? Your pain has lasted longer than 4 weeks. ? You have had weight loss you cannot explain. ? You have a fever. ? You are age 48 or older. ? You have cancer now or have had it before.    Watch closely for changes in your health, and be sure to contact your doctor if you are not getting better as expected. Where can you learn more? Go to http://rangel-nikos.info/. Enter N402 in the search box to learn more about \"Back Pain, Emergency or Urgent Symptoms: Care Instructions. \"  Current as of: June 26, 2019  Content Version: 12.2  © 9989-3521 Healthwise[employer] Incorporated. Care instructions adapted under license by [employer] (which disclaims liability or warranty for this information). If you have questions about a medical condition or this instruction, always ask your healthcare professional. [place] [address] any warranty or liability for your use of this information. Patient Education        Numbness and Tingling: Care Instructions  Your Care Instructions    Many things can cause numbness or tingling. Swelling may put pressure on a nerve. This could cause you to lose feeling or have a pins-and-needles sensation on part of your body.  Nerves may be damaged from trauma, toxins, or diseases, such as diabetes or multiple sclerosis (MS). Sometimes, though, the cause is not clear. If there is no clear reason for your symptoms, and you are not having any other symptoms, your doctor may suggest watching and waiting for a while to see if the numbness or tingling goes away on its own. Your doctor may want you to have blood or nerve tests to find the cause of your symptoms. Follow-up care is a key part of your treatment and safety. Be sure to make and go to all appointments, and call your doctor if you are having problems. It's also a good idea to know your test results and keep a list of the medicines you take. How can you care for yourself at home? · If your doctor prescribes medicine, take it exactly as directed. Call your doctor if you think you are having a problem with your medicine. · If you have any swelling, put ice or a cold pack on the area for 10 to 20 minutes at a time. Put a thin cloth between the ice and your skin. When should you call for help? Call 911 anytime you think you may need emergency care. For example, call if:    · You have weakness, numbness, or tingling in both legs.     · You lose bowel or bladder control.     · You have symptoms of a stroke. These may include:  ? Sudden numbness, tingling, weakness, or loss of movement in your face, arm, or leg, especially on only one side of your body. ? Sudden vision changes. ? Sudden trouble speaking. ? Sudden confusion or trouble understanding simple statements. ? Sudden problems with walking or balance. ? A sudden, severe headache that is different from past headaches.    Watch closely for changes in your health, and be sure to contact your doctor if you have any problems, or if:    · You do not get better as expected. Where can you learn more? Go to http://rangel-nikos.info/. Enter B619 in the search box to learn more about \"Numbness and Tingling: Care Instructions. \"  Current as of: March 28, 2019  Content Version: 12.2  © 7444-4802 Healthwise, [employer]. Care instructions adapted under license by [employer] (which disclaims liability or warranty for this information). If you have questions about a medical condition or this instruction, always ask your healthcare professional. [place] [address] any warranty or liability for your use of this information. Patient Education        Tinnitus: Care Instructions  Your Care Instructions    Many people have some ringing sounds in their ears once in a while. You may hear a roar, a hiss, a tinkle, or a buzz. The sound usually lasts only a few minutes. If it goes on all the time, you may have tinnitus. Tinnitus is usually caused by long-term exposure to loud noise. This damages the nerves in the inner ear. It can occur with all types of hearing loss. It may be a symptom of almost any ear problem. Tinnitus may be caused by a buildup of earwax. Or it may be caused by ear infections or certain medicines (especially antibiotics or large amounts of aspirin). You can also hear noises in your ears because of an injury to the ears, drinking too much alcohol or caffeine, or a medical condition. You may need tests to evaluate your hearing and to find causes of long-lasting tinnitus. Your doctor may suggest one or more treatments to help you cope with it. You can also do things at home to help reduce symptoms. Follow-up care is a key part of your treatment and safety. Be sure to make and go to all appointments, and call your doctor if you are having problems. It's also a good idea to know your test results and keep a list of the medicines you take. How can you care for yourself at home? · Limit or cut out alcohol and caffeine. They can make your symptoms worse. · Do not smoke or use other tobacco products. Nicotine reduces blood flow to the ear and makes tinnitus worse. If you need help quitting, talk to your doctor about stop-smoking programs and medicines. These can increase your chances of quitting for good. · Talk to your doctor about whether to stop taking aspirin and similar products such as ibuprofen or naproxen. · Get exercise often. It can improve blood flow to the ear. Ways to cope with noise  Some tinnitus may last a long time. To cope with noise, try to:  · Avoid noises that you think caused your tinnitus. If you can't avoid loud noises, wear earplugs or earmuffs. · Ignore the sound by paying attention to other things. · Relax using biofeedback, meditation, or yoga. Feeling stressed and being tired can make tinnitus worse. · Play music or white noise to help you sleep. Background noise may cover up the noise that you hear in your ears. You can buy a machine that makes soothing sounds, such as ocean waves. When should you call for help? Call 911 anytime you think you may need emergency care. For example, call if:    · You have symptoms of a stroke. These may include:  ? Sudden numbness, tingling, weakness, or loss of movement in your face, arm, or leg, especially on only one side of your body. ? Sudden vision changes. ? Sudden trouble speaking. ? Sudden confusion or trouble understanding simple statements. ? Sudden problems with walking or balance. ? A sudden, severe headache that is different from past headaches.    Call your doctor now or seek immediate medical care if:    · You develop other symptoms. These may include hearing loss (or worse hearing loss), balance problems, dizziness, nausea, or vomiting.    Watch closely for changes in your health, and be sure to contact your doctor if:    · Your tinnitus moves from both ears to one ear.     · Your hearing loss gets worse within 1 day after an ear injury.     · Your tinnitus or hearing loss does not get better within 1 week after an ear injury.     · Your tinnitus bothers you enough that you want to take medicines to help you cope with it. Where can you learn more?   Go to http://rangel-nikos.info/. Enter S165 in the search box to learn more about \"Tinnitus: Care Instructions. \"  Current as of: October 21, 2018  Content Version: 12.2  © 7843-7689 [employer], [employer]. Care instructions adapted under license by [employer] (which disclaims liability or warranty for this information). If you have questions about a medical condition or this instruction, always ask your healthcare professional. [place] [address] any warranty or liability for your use of this information.

## 2020-05-14 ENCOUNTER — HOSPITAL ENCOUNTER (OUTPATIENT)
Dept: GENERAL RADIOLOGY | Age: 50
Discharge: HOME | End: 2020-05-14
Payer: COMMERCIAL

## 2020-05-14 DIAGNOSIS — G56.00 CARPAL TUNNEL SYNDROME: ICD-10-CM

## 2020-05-14 DIAGNOSIS — M54.50 LUMBAGO: ICD-10-CM

## 2020-05-14 DIAGNOSIS — M19.90 SENILE ARTHRITIS: ICD-10-CM

## 2020-05-14 PROCEDURE — 73130 X-RAY EXAM OF HAND: CPT

## 2020-05-14 PROCEDURE — 72114 X-RAY EXAM L-S SPINE BENDING: CPT

## 2020-10-26 NOTE — TELEPHONE ENCOUNTER
Patient advised lab results and copy of letter mailed to patient.
Pt is checking on lab results     Best number to reach him is [phone]
Constant observation

## 2020-11-27 ENCOUNTER — TRANSCRIBE ORDER (OUTPATIENT)
Dept: SCHEDULING | Age: 50
End: 2020-11-27

## 2020-11-27 DIAGNOSIS — H93.19 TINNITUS: ICD-10-CM

## 2020-11-27 DIAGNOSIS — R42 DIZZINESS: Primary | ICD-10-CM

## 2020-12-03 ENCOUNTER — HOSPITAL ENCOUNTER (OUTPATIENT)
Dept: MRI IMAGING | Age: 50
Discharge: HOME | End: 2020-12-03
Attending: FAMILY MEDICINE
Payer: COMMERCIAL

## 2020-12-03 DIAGNOSIS — R42 DIZZINESS: ICD-10-CM

## 2020-12-03 DIAGNOSIS — H93.19 TINNITUS: ICD-10-CM

## 2020-12-03 PROCEDURE — 70551 MRI BRAIN STEM W/O DYE: CPT

## 2022-03-18 PROBLEM — M51.369 DDD (DEGENERATIVE DISC DISEASE), LUMBAR: Status: ACTIVE | Noted: 2017-03-08

## 2022-03-18 PROBLEM — M51.36 DDD (DEGENERATIVE DISC DISEASE), LUMBAR: Status: ACTIVE | Noted: 2017-03-08

## 2022-03-19 PROBLEM — Z91.199 NONCOMPLIANCE: Status: ACTIVE | Noted: 2017-05-22

## 2022-03-20 PROBLEM — F45.20 HYPOCHONDRIACAL DISORDER: Status: ACTIVE | Noted: 2017-05-22

## 2022-08-25 ENCOUNTER — APPOINTMENT (OUTPATIENT)
Dept: CT IMAGING | Age: 52
End: 2022-08-25
Attending: FAMILY MEDICINE
Payer: COMMERCIAL

## 2022-08-25 ENCOUNTER — HOSPITAL ENCOUNTER (EMERGENCY)
Age: 52
Discharge: HOME | End: 2022-08-25
Attending: EMERGENCY MEDICINE
Payer: COMMERCIAL

## 2022-08-25 VITALS
RESPIRATION RATE: 18 BRPM | SYSTOLIC BLOOD PRESSURE: 138 MMHG | DIASTOLIC BLOOD PRESSURE: 90 MMHG | OXYGEN SATURATION: 97 % | HEART RATE: 98 BPM | TEMPERATURE: 98 F

## 2022-08-25 DIAGNOSIS — R10.13 ABDOMINAL PAIN, EPIGASTRIC: Primary | ICD-10-CM

## 2022-08-25 LAB
ALBUMIN SERPL-MCNC: 3.8 G/DL (ref 3.5–5)
ALBUMIN/GLOB SERPL: 1.2 {RATIO} (ref 1.1–2.2)
ALP SERPL-CCNC: 62 U/L (ref 45–117)
ALT SERPL-CCNC: 47 U/L (ref 12–78)
ANION GAP SERPL CALC-SCNC: 5 MMOL/L (ref 5–15)
APPEARANCE UR: CLEAR
AST SERPL-CCNC: 15 U/L (ref 15–37)
BACTERIA URNS QL MICRO: NEGATIVE /HPF
BASOPHILS # BLD: 0 K/UL (ref 0–0.1)
BASOPHILS NFR BLD: 0 % (ref 0–1)
BILIRUB SERPL-MCNC: 0.6 MG/DL (ref 0.2–1)
BILIRUB UR QL: NEGATIVE
BUN SERPL-MCNC: 13 MG/DL (ref 6–20)
BUN/CREAT SERPL: 13 (ref 12–20)
CALCIUM SERPL-MCNC: 8.6 MG/DL (ref 8.5–10.1)
CHLORIDE SERPL-SCNC: 104 MMOL/L (ref 97–108)
CO2 SERPL-SCNC: 26 MMOL/L (ref 21–32)
COLOR UR: ABNORMAL
COMMENT, HOLDF: NORMAL
CREAT SERPL-MCNC: 0.97 MG/DL (ref 0.7–1.3)
DIFFERENTIAL METHOD BLD: ABNORMAL
EOSINOPHIL # BLD: 0.1 K/UL (ref 0–0.4)
EOSINOPHIL NFR BLD: 1 % (ref 0–7)
EPITH CASTS URNS QL MICRO: ABNORMAL /LPF
ERYTHROCYTE [DISTWIDTH] IN BLOOD BY AUTOMATED COUNT: 11.8 % (ref 11.5–14.5)
GLOBULIN SER CALC-MCNC: 3.2 G/DL (ref 2–4)
GLUCOSE BLD STRIP.AUTO-MCNC: 192 MG/DL (ref 65–117)
GLUCOSE SERPL-MCNC: 213 MG/DL (ref 65–100)
GLUCOSE UR STRIP.AUTO-MCNC: >1000 MG/DL
HCT VFR BLD AUTO: 44 % (ref 36.6–50.3)
HGB BLD-MCNC: 14.6 G/DL (ref 12.1–17)
HGB UR QL STRIP: NEGATIVE
HYALINE CASTS URNS QL MICRO: ABNORMAL /LPF (ref 0–5)
IMM GRANULOCYTES # BLD AUTO: 0.1 K/UL (ref 0–0.04)
IMM GRANULOCYTES NFR BLD AUTO: 1 % (ref 0–0.5)
KETONES UR QL STRIP.AUTO: ABNORMAL MG/DL
LEUKOCYTE ESTERASE UR QL STRIP.AUTO: NEGATIVE
LIPASE SERPL-CCNC: 191 U/L (ref 73–393)
LYMPHOCYTES # BLD: 0.7 K/UL (ref 0.8–3.5)
LYMPHOCYTES NFR BLD: 11 % (ref 12–49)
MCH RBC QN AUTO: 29.6 PG (ref 26–34)
MCHC RBC AUTO-ENTMCNC: 33.2 G/DL (ref 30–36.5)
MCV RBC AUTO: 89.2 FL (ref 80–99)
MONOCYTES # BLD: 0.4 K/UL (ref 0–1)
MONOCYTES NFR BLD: 6 % (ref 5–13)
NEUTS SEG # BLD: 5 K/UL (ref 1.8–8)
NEUTS SEG NFR BLD: 81 % (ref 32–75)
NITRITE UR QL STRIP.AUTO: NEGATIVE
NRBC # BLD: 0 K/UL (ref 0–0.01)
NRBC BLD-RTO: 0 PER 100 WBC
PH UR STRIP: 5.5 [PH] (ref 5–8)
PLATELET # BLD AUTO: 291 K/UL (ref 150–400)
PMV BLD AUTO: 10.3 FL (ref 8.9–12.9)
POTASSIUM SERPL-SCNC: 3.6 MMOL/L (ref 3.5–5.1)
PROT SERPL-MCNC: 7 G/DL (ref 6.4–8.2)
PROT UR STRIP-MCNC: ABNORMAL MG/DL
RBC # BLD AUTO: 4.93 M/UL (ref 4.1–5.7)
RBC #/AREA URNS HPF: ABNORMAL /HPF (ref 0–5)
RBC MORPH BLD: ABNORMAL
SAMPLES BEING HELD,HOLD: NORMAL
SERVICE CMNT-IMP: ABNORMAL
SODIUM SERPL-SCNC: 135 MMOL/L (ref 136–145)
SP GR UR REFRACTOMETRY: >1.03
UR CULT HOLD, URHOLD: NORMAL
UROBILINOGEN UR QL STRIP.AUTO: 0.2 EU/DL (ref 0.2–1)
WBC # BLD AUTO: 6.3 K/UL (ref 4.1–11.1)
WBC URNS QL MICRO: ABNORMAL /HPF (ref 0–4)

## 2022-08-25 PROCEDURE — 36415 COLL VENOUS BLD VENIPUNCTURE: CPT

## 2022-08-25 PROCEDURE — 74177 CT ABD & PELVIS W/CONTRAST: CPT

## 2022-08-25 PROCEDURE — 81001 URINALYSIS AUTO W/SCOPE: CPT

## 2022-08-25 PROCEDURE — 96374 THER/PROPH/DIAG INJ IV PUSH: CPT

## 2022-08-25 PROCEDURE — 85025 COMPLETE CBC W/AUTO DIFF WBC: CPT

## 2022-08-25 PROCEDURE — 99285 EMERGENCY DEPT VISIT HI MDM: CPT

## 2022-08-25 PROCEDURE — 82962 GLUCOSE BLOOD TEST: CPT

## 2022-08-25 PROCEDURE — 74011000636 HC RX REV CODE- 636: Performed by: EMERGENCY MEDICINE

## 2022-08-25 PROCEDURE — 96375 TX/PRO/DX INJ NEW DRUG ADDON: CPT

## 2022-08-25 PROCEDURE — C9113 INJ PANTOPRAZOLE SODIUM, VIA: HCPCS | Performed by: FAMILY MEDICINE

## 2022-08-25 PROCEDURE — 74011000250 HC RX REV CODE- 250: Performed by: FAMILY MEDICINE

## 2022-08-25 PROCEDURE — 80053 COMPREHEN METABOLIC PANEL: CPT

## 2022-08-25 PROCEDURE — 74011250636 HC RX REV CODE- 250/636: Performed by: FAMILY MEDICINE

## 2022-08-25 PROCEDURE — 83690 ASSAY OF LIPASE: CPT

## 2022-08-25 RX ORDER — MORPHINE SULFATE 2 MG/ML
2 INJECTION, SOLUTION INTRAMUSCULAR; INTRAVENOUS
Status: DISCONTINUED | OUTPATIENT
Start: 2022-08-25 | End: 2022-08-25 | Stop reason: HOSPADM

## 2022-08-25 RX ORDER — HYDROGEN PEROXIDE 3 %
20 SOLUTION, NON-ORAL MISCELLANEOUS DAILY
Qty: 20 CAPSULE | Refills: 0 | Status: SHIPPED | OUTPATIENT
Start: 2022-08-25 | End: 2022-09-14

## 2022-08-25 RX ORDER — SUCRALFATE 1 G/1
1 TABLET ORAL 4 TIMES DAILY
Qty: 80 TABLET | Refills: 0 | Status: SHIPPED | OUTPATIENT
Start: 2022-08-25 | End: 2022-09-14

## 2022-08-25 RX ORDER — ONDANSETRON 2 MG/ML
4 INJECTION INTRAMUSCULAR; INTRAVENOUS
Status: COMPLETED | OUTPATIENT
Start: 2022-08-25 | End: 2022-08-25

## 2022-08-25 RX ADMIN — IOPAMIDOL 100 ML: 755 INJECTION, SOLUTION INTRAVENOUS at 14:05

## 2022-08-25 RX ADMIN — SODIUM CHLORIDE 1000 ML: 9 INJECTION, SOLUTION INTRAVENOUS at 13:09

## 2022-08-25 RX ADMIN — ONDANSETRON HYDROCHLORIDE 4 MG: 2 SOLUTION INTRAMUSCULAR; INTRAVENOUS at 13:09

## 2022-08-25 RX ADMIN — SODIUM CHLORIDE 40 MG: 9 INJECTION INTRAMUSCULAR; INTRAVENOUS; SUBCUTANEOUS at 13:09

## 2022-08-25 NOTE — ED PROVIDER NOTES
Patient is a 71-year-old male with past medical history of diabetes, hypertension presenting for evaluation of abdominal pain. Reports that he has noticed abdominal pain intermittently over the last week, but became severe last night. Reports the pain is in his upper abdomen. Describes pain as sharp. Pain does not radiate anywhere else besides abdominal wall. Endorses intermittent nausea without vomiting. No associated diarrhea or blood in stool. Bowel movements have been normal in nature. No history of abdominal surgeries. Has not noticed any aggravating or relieving factors. Pain not associated with eating.        Past Medical History:   Diagnosis Date    Acute right-sided low back pain without sciatica 11/4/2016    Anxiety 11/4/2016    Chronic pain     Contact dermatitis and other eczema, due to unspecified cause     hives    Diabetes mellitus without complication ([address] 15/5/1046    Diabetes mellitus without complication ([facility] [address] 56/1/0399    Dyslipidemia 12/2/2014    Ear ringing 11/4/2016    Encounter for immunization 11/4/2016    Erectile dysfunction 11/4/2016    Herniated disc     Hypertension     Hypochondriacal disorder 5/22/2017    Hypovitaminosis D 12/2/2014    Multiple joint pain 11/4/2016    Noncompliance 5/22/2017    Noncompliance 5/22/2017    Obesity     Primary insomnia 11/4/2016    Right wrist pain 12/2/2014    Screen for STD (sexually transmitted disease) 11/4/2016       Past Surgical History:   Procedure Laterality Date    HX ORTHOPAEDIC      lumbar steroid injection for pain management         Family History:   Problem Relation Age of Onset    Colon Polyps Father        Social History     Socioeconomic History    Marital status:      Spouse name: Not on file    Number of children: 2    Years of education: Not on file    Highest education level: Not on file   Occupational History     Employer: NOT EMPLOYED   Tobacco Use    Smoking status: Every Day     Years: 4.00     Types: Cigarettes Last attempt to quit: 10/1/2014     Years since quittin.9    Smokeless tobacco: Never   Substance and Sexual Activity    Alcohol use: Yes     Alcohol/week: 8.3 standard drinks     Types: 10 Cans of beer per week     Comment: rarely     Drug use: No    Sexual activity: Yes     Partners: Female     Birth control/protection: None   Other Topics Concern    Not on file   Social History Narrative    Not on file     Social Determinants of Health     Financial Resource Strain: Not on file   Food Insecurity: Not on file   Transportation Needs: Not on file   Physical Activity: Not on file   Stress: Not on file   Social Connections: Not on file   Intimate Partner Violence: Not on file   Housing Stability: Not on file         ALLERGIES: Patient has no known allergies. Review of Systems   Constitutional:  Negative for unexpected weight change. HENT:  Negative for congestion. Eyes:  Negative for visual disturbance. Respiratory:  Negative for cough, chest tightness and shortness of breath. Cardiovascular:  Negative for chest pain. Gastrointestinal:  Positive for abdominal pain and nausea. Negative for constipation and vomiting. Endocrine: Negative for polyuria. Genitourinary:  Negative for dysuria and flank pain. Musculoskeletal:  Negative for back pain. Skin:  Negative for color change. Allergic/Immunologic: Negative for immunocompromised state. Neurological:  Negative for dizziness and headaches. Hematological:  Negative for adenopathy. Psychiatric/Behavioral:  Negative for agitation. Vitals:    22 1245   BP: (!) 138/90   Pulse: 98   Resp: 18   Temp: 98 °F (36.7 °C)   SpO2: 97%            Physical Exam  Vitals and nursing note reviewed. Constitutional:       General: He is not in acute distress. Appearance: He is well-developed. He is obese. HENT:      Head: Atraumatic. Eyes:      Pupils: Pupils are equal, round, and reactive to light.    Cardiovascular:      Rate and Rhythm: Normal rate. Pulmonary:      Effort: Pulmonary effort is normal. No respiratory distress. Abdominal:      General: Abdomen is protuberant. Bowel sounds are normal.      Palpations: Abdomen is soft. Tenderness: There is abdominal tenderness in the epigastric area and periumbilical area. There is guarding. There is no right CVA tenderness, left CVA tenderness or rebound. Negative signs include Hanley's sign, Rovsing's sign and McBurney's sign. Hernia: No hernia is present. Skin:     General: Skin is warm and dry. Capillary Refill: Capillary refill takes less than 2 seconds. Neurological:      General: No focal deficit present. Mental Status: He is alert and oriented to person, place, and time. Psychiatric:         Mood and Affect: Mood normal.         Behavior: Behavior normal.        MDM  Number of Diagnoses or Management Options  Abdominal pain, epigastric  Diagnosis management comments: Patient presenting with abdominal pain, mild guarding present on exam.  Differential diagnosis includes GERD, peptic ulcer disease, diverticulitis, pancreatitis. Will obtain labs and CT scan for further evaluation. 1505 -labs today within normal limits, no leukocytosis, lipase nonelevated. CT of abdomen and pelvis with no acute findings. Based on descriptor and location of pain, suspect GERD versus peptic ulcer disease. We will start patient on Nexium, Carafate and refer to GI for possible EGD. Discussed my clinical impression(s), any labs and/or radiology results with the patient. I answered any questions and addressed any concerns. Discussed the importance of following up with their primary care physician and/or specialist(s). Discussed signs or symptoms that would warrant return back to the ER for further evaluation. The patient is agreeable with discharge.        Amount and/or Complexity of Data Reviewed  Clinical lab tests: ordered and reviewed  Tests in the radiology section of CPT®: ordered and reviewed           Procedures

## 2022-08-25 NOTE — ED TRIAGE NOTES
Pt c/o sharp upper abd pain that started last night.  +nausea. Pt also reports glucose has been running in the 400's.

## 2022-08-25 NOTE — DISCHARGE INSTRUCTIONS
Thank you for allowing us to provide you with medical care today. We realize that you have many choices for your emergency care needs. We thank you for choosing [address] [place] [address]. Please choose us in the future for any continued health care needs. The exam and treatment you received in the emergency department were for an emergent problem and are not intended as complete care. It is important that you follow-up with a doctor. If your symptoms worsen or you do not improve should return to the emergency department. We are available 24 hours a day. Please make an appointment with your health care provider for follow-up of your emergency department visit. Take this sheet with you when you go to your follow-up visit.

## 2022-09-27 ENCOUNTER — OFFICE VISIT (OUTPATIENT)
Dept: SURGERY | Age: 52
End: 2022-09-27
Payer: COMMERCIAL

## 2022-09-27 ENCOUNTER — TRANSCRIBE ORDER (OUTPATIENT)
Dept: SCHEDULING | Age: 52
End: 2022-09-27

## 2022-09-27 VITALS
SYSTOLIC BLOOD PRESSURE: 148 MMHG | RESPIRATION RATE: 16 BRPM | OXYGEN SATURATION: 95 % | BODY MASS INDEX: 31.49 KG/M2 | TEMPERATURE: 97.9 F | DIASTOLIC BLOOD PRESSURE: 93 MMHG | HEIGHT: 68 IN | HEART RATE: 76 BPM | WEIGHT: 207.8 LBS

## 2022-09-27 DIAGNOSIS — R22.41 MASS OF LOWER LEG, RIGHT: Primary | ICD-10-CM

## 2022-09-27 DIAGNOSIS — R22.41 KNEE MASS, RIGHT: Primary | ICD-10-CM

## 2022-09-27 PROCEDURE — 99203 OFFICE O/P NEW LOW 30 MIN: CPT | Performed by: SURGERY

## 2022-09-27 NOTE — PROGRESS NOTES
Identified pt with two pt identifiers(name and ). Reviewed record in preparation for visit and have obtained necessary documentation. Chief Complaint   Patient presents with    New Patient     mass on leg, Dr Burkett Safe:    22 0900   BP: (!) 148/93   Pulse: 76   Resp: 16   Temp: 97.9 °F (36.6 °C)   TempSrc: Oral   SpO2: 95%   Weight: 94.3 kg (207 lb 12.8 oz)   Height: 5' 8\" (1.727 m)   PainSc:   7   PainLoc: Leg       Health Maintenance Review: Patient reminded of \"due or due soon\" health maintenance. I have asked the patient to contact his/her primary care provider (PCP) for follow-up on his/her health maintenance. Coordination of Care Questionnaire:  :   1) Have you been to an emergency room, urgent care, or hospitalized since your last visit? If yes, where when, and reason for visit? no       2. Have seen or consulted any other health care provider since your last visit? If yes, where when, and reason for visit? NO      Patient is accompanied by self I have received verbal consent from Radha Garcia to discuss any/all medical information while they are present in the room.

## 2022-09-27 NOTE — PROGRESS NOTES
HISTORY OF PRESENT ILLNESS  Charlee Tariq is a 46 y.o. male who comes in for consultation by Dr Henrique Hidalgo for a leg mass  HPI  He has noted right lower leg \"mass\" for about a year. It is painful and can extend up his leg. He denies trauma. It seems to be getting a little larger. He denies skin changes, ulceration, drainage. The symptoms are worse when he is on his feet all day and improve when laying down. Past Medical History:   Diagnosis Date    Acute right-sided low back pain without sciatica 2016    Anxiety 2016    Chronic pain     Contact dermatitis and other eczema, due to unspecified cause     hives    Diabetes mellitus without complication ([facility] [address]     Diabetes mellitus without complication ([facility] [address] 8456    Dyslipidemia 2014    Ear ringing 2016    Encounter for immunization 2016    Erectile dysfunction 2016    Herniated disc     Hypertension     Hypochondriacal disorder 2017    Hypovitaminosis D 2014    Multiple joint pain 2016    Noncompliance 2017    Noncompliance 2017    Obesity     Primary insomnia 2016    Right wrist pain 2014    Screen for STD (sexually transmitted disease) 2016     Past Surgical History:   Procedure Laterality Date    HX ORTHOPAEDIC      lumbar steroid injection for pain management     Family History   Problem Relation Age of Onset    Colon Polyps Father      Social History     Tobacco Use    Smoking status: Every Day     Years: 4.00     Types: Cigarettes     Last attempt to quit: 10/1/2014     Years since quittin.9    Smokeless tobacco: Never   Substance Use Topics    Alcohol use: Yes     Alcohol/week: 8.3 standard drinks     Types: 10 Cans of beer per week     Comment: rarely     Drug use: No     Current Outpatient Medications   Medication Sig    metFORMIN (GLUCOPHAGE) 500 mg tablet Take 1 Tab by mouth two (2) times daily (with meals).     omeprazole (PRILOSEC) 20 mg capsule Take 1 Cap by mouth daily. (Patient taking differently: Take 20 mg by mouth as needed.)    tadalafil (CIALIS) 20 mg tablet Take 1 Tab by mouth as needed. Lancets misc Check once daily    glucose blood VI test strips (ASCENSIA AUTODISC VI, ONE TOUCH ULTRA TEST VI) strip Check once daily     No current facility-administered medications for this visit. No Known Allergies    Review of Systems   Constitutional:  Negative for chills, diaphoresis, fever, malaise/fatigue and weight loss. HENT:  Positive for hearing loss. Negative for congestion, ear pain and sore throat. Eyes:  Positive for blurred vision. Negative for pain. Respiratory:  Negative for cough, hemoptysis, sputum production, shortness of breath, wheezing and stridor. Sleep apnea   Cardiovascular:  Negative for chest pain, palpitations, orthopnea, claudication, leg swelling and PND. Gastrointestinal:  Negative for blood in stool, constipation, diarrhea, heartburn, melena, nausea and vomiting. Genitourinary:  Negative for dysuria, flank pain, frequency, hematuria and urgency. Musculoskeletal:  Positive for back pain, joint pain and myalgias. Negative for neck pain. Skin:  Negative for itching and rash. Neurological:  Negative for dizziness, tremors, focal weakness, seizures, weakness and headaches. Endo/Heme/Allergies:  Negative for polydipsia. Psychiatric/Behavioral:  Negative for depression and memory loss. The patient is not nervous/anxious. Visit Vitals  BP (!) 148/93 (BP 1 Location: Left arm, BP Patient Position: Sitting)   Pulse 76   Temp 97.9 °F (36.6 °C) (Oral)   Resp 16   Ht 5' 8\" (1.727 m)   Wt 94.3 kg (207 lb 12.8 oz)   SpO2 95%   BMI 31.60 kg/m²       Physical Exam  Constitutional:       General: He is not in acute distress. Appearance: Normal appearance. He is well-developed. He is not diaphoretic. HENT:      Head: Normocephalic and atraumatic. Mouth/Throat:      Pharynx: No oropharyngeal exudate.    Eyes: General: No scleral icterus. Conjunctiva/sclera: Conjunctivae normal.      Pupils: Pupils are equal, round, and reactive to light. Neck:      Thyroid: No thyromegaly. Trachea: No tracheal deviation. Cardiovascular:      Rate and Rhythm: Normal rate and regular rhythm. Heart sounds: Normal heart sounds. No murmur heard. No friction rub. No gallop. Pulmonary:      Effort: Pulmonary effort is normal. No respiratory distress. Breath sounds: Normal breath sounds. No stridor. No wheezing or rales. Abdominal:      General: Bowel sounds are normal. There is no distension. Palpations: Abdomen is soft. There is no mass. Tenderness: There is no abdominal tenderness. There is no guarding or rebound. Hernia: No hernia is present. There is no hernia in the left inguinal area. Genitourinary:     Penis: Circumcised. Testes: Normal. Cremasteric reflex is present. Musculoskeletal:         General: No tenderness. Normal range of motion. Cervical back: Normal range of motion and neck supple. Comments: Right lower leg laterally with a 3 cm area of soft fullness. It seems to disappear with pressure. No ulcer, edema, erythema, pores, skin discoloration   Lymphadenopathy:      Cervical: No cervical adenopathy. Skin:     General: Skin is warm and dry. Findings: No erythema or rash. Neurological:      Mental Status: He is alert and oriented to person, place, and time. Cranial Nerves: No cranial nerve deficit. Coordination: Coordination normal.   Psychiatric:         Mood and Affect: Mood normal.         Behavior: Behavior normal.         Thought Content: Thought content normal.         Judgment: Judgment normal.       ASSESSMENT and PLAN   Right lateral lower leg \"mass\". I am uncertain whether this is a true mass or a venous process. I explained to him about the anatomy and pathophysiology of both processes.    I explained that a mass was unlikely to cause the other symptoms. We discussed excision if it is a mass and referral to vascular if it is a venous problem. GERD. PPI prn  NIDDM type 2. On metformin    At this point we will get an US to better evaluate the area.   I also suggested getting compression socks in the interim    Will call with US results    Liliana Asher MD FACS

## 2022-09-27 NOTE — LETTER
9/27/2022    Patient: Alyssa Rolon   YOB: 1970   Date of Visit: 9/27/2022     Scott Elizondo, [address] [facility]  [address] [address]  Via Fax: [phone]    Dear Scott Elizondo MD,      Thank you for referring Mr. Arina Wood to [address] for evaluation. My notes for this consultation are attached. If you have questions, please do not hesitate to call me. I look forward to following your patient along with you.       Sincerely,    Mily Medley MD

## 2022-09-28 ENCOUNTER — HOSPITAL ENCOUNTER (OUTPATIENT)
Dept: ULTRASOUND IMAGING | Age: 52
Discharge: HOME | End: 2022-09-28
Attending: SURGERY
Payer: COMMERCIAL

## 2022-09-28 DIAGNOSIS — R22.41 MASS OF LOWER LEG, RIGHT: ICD-10-CM

## 2022-09-28 PROCEDURE — 76882 US LMTD JT/FCL EVL NVASC XTR: CPT

## 2022-09-30 ENCOUNTER — TELEPHONE (OUTPATIENT)
Dept: SURGERY | Age: 52
End: 2022-09-30

## 2022-09-30 NOTE — TELEPHONE ENCOUNTER
Returned call to patient. I let him know that Dr. Tata Roe is in clinic until this afternoon, that I would relay the message and we would reach back out to him. Patient was comfortable with this.      [phone]

## 2022-09-30 NOTE — TELEPHONE ENCOUNTER
US  did not demonstrate anything concerning  No mass or vein  Reviewed imaging with the radiologist    Recommend elevation when not walking  Wear compression socks    RTC 3 months if not improved    Cecilio Calhoun MD FACS

## 2022-10-28 ENCOUNTER — CLINICAL SUPPORT (OUTPATIENT)
Dept: DIABETES SERVICES | Age: 52
End: 2022-10-28
Payer: COMMERCIAL

## 2022-10-28 DIAGNOSIS — E11.65 TYPE 2 DIABETES MELLITUS WITH HYPERGLYCEMIA, UNSPECIFIED WHETHER LONG TERM INSULIN USE (HCC): Primary | ICD-10-CM

## 2022-10-28 PROCEDURE — G0108 DIAB MANAGE TRN  PER INDIV: HCPCS

## 2022-10-28 NOTE — PROGRESS NOTES
[address] [place] [address] for Diabetes Health  Diabetes Self-Management Education & Support Program    Reason for Referral: DM 2  Referral Source: Raleigh Kent MD  Services requested: DSMES       ASSESSMENT    From my perspective, the participant would benefit from [facility] specifically related to reducing risks, healthy eating, monitoring, taking medications, physical activity, healthy coping, and problem solving. Will adapt DSMES program to build on participant's skills score, confidence score, and preparedness score as noted in the Diabetes Skills, Confidence, and Preparedness Index. During the program, we will focus on providing DSMES that specifically addresses participant's interest in reducing risks, healthy eating, monitoring, taking medications, physical activity, healthy coping, and problem solving, as shown by their reported readiness to change. The participant would be best served by attending weekly individual sessions. Diabetes Self-Management Education Follow-up Visit: November 17, 2022       Clinical Presentation  Garima Glover is a 46 y.o.  male referred for diabetes self-management education. Participant has Type 2 DM not on insulin for 1-10 years. Family history positivefor diabetes. Patient reports not receiving DSMES services in the past. Most recent A1c value: 8 on 9/29/2022  Lab Results   Component Value Date/Time    Hemoglobin A1c 6.2 (H) 11/17/2017 04:13 PM    Hemoglobin A1c (POC) 5.3 11/20/2014 01:13 PM       Diabetes-related medications:  Current dosing:   Key Antihyperglycemic Medications               metFORMIN (GLUCOPHAGE) 500 mg tablet Take 2 Tabs by mouth two (2) times daily (with meals). Blood Pressure Management  Key ACE/ARB Medications       Patient is on no ACE or ARB meds. Lipid Management  Key Antihyperlipidemia Meds       The patient is on no antihyperlipidemia meds.             Clot Prevention  Key Anti-Platelet Anticoagulant Meds The patient is on no antiplatelet meds or anticoagulants. Learning Assessment  Learning objectives Educator assessment (10/28/2022)   Diabetes Disease Process  The participant can   A) describe diabetes in basic terms;   B) state the type of diabetes they have; &   C) state accepted blood glucose targets. Healthy Eating  The participant can   A) identify carbohydrate foods; &   B) accurately read food labels. Being Active  The participant can  A) state the benefits of physical activity;  B) report their current PA practices;  C) identify PA they would consider incorporating in their lives; &  D) develop an implementation plan. Monitoring  The participant can  A) operate their blood glucose meter; &  B) describe how they log their blood glucoses to share with their provider. Taking Medications  The participant can  A) name their diabetes medications;  B) state the purpose and dose;  C) note side effects; &  D) describe proper storage, disposal & transport (if appropriate). Healthy Coping  The participant can    A) describe their response to diabetes diagnosis; B) describe their specific coping mechanisms;  C) identify supportive people and/or other resources that positively support their diabetes self-care and health. Reducing Risks  The participant can describe the preventive measures used by providers to promote health and prevent diabetes complications. Problem Solving  The participant can   A) identify signs, symptoms & treatment of hypoglycemia;    B) identify signs, symptoms & treatment of hyperglycemia;  C) describe their sick day plan; &  D) identify BG patterns to discuss with their provider.      No  Yes  No        Yes, some-bread, potatoes  No        Yes  Yes, walks at work  Yes, has gym at home  Yes        Yes, stopped checking BG a week ago  No      No  No  Yes, none  Yes        Yes, surprised  Yes  Yes, wife        No          No  No  No  No     Characteristics to Learning   Barriers to Learning      None     Favorite Ways to Learn   [] Lecture  [] Slides  [] Reading [] Video-Internet  [] Cassettes/CDs/MP3's  [] Interactive Small Groups [x] Other one on one       Behavioral Assessment  Current self-care practices  Educator assessment (10/28/2022)   Healthy Eating   Current practices    24-hour Dietary Recall:  Breakfast: protein shake  or egg whites or 1 packet oatmeal with berries, vit c drink  Lunch: cheeseburger from [employer] or packs, carrots, nuts, pork rinds, apple, water  Dinner: salmon or chicken air fried, broccoli, salad, water  Snacks: chips, peanuts, sugar free candy  Beverages: water, zero soda, sugar free lemonade  Alcohol: 12 pack day on weekends, none during the week       Would benefit from [facility] related to Healthy Eating: Yes    Eats a carbohydrate controlled diet: No    Stage of change: Action      Being Active  Current practices  How many days during the past week have you performed physical activity where your heart beats faster and your breathing is harder than normal for 30 minutes or more?  0 day(s)    How many days in a typical week do you perform activity such as this?  0 day(s)     Would benefit from [facility] related to Being Active: Yes}      Exercises 150 minutes/week: No      Stage of change: Preparation     Monitoring  Current practices  Do you monitor your blood sugar? Not currently, shared stopped about a week ago because too high. Was checking 2 x daily, FBS and bedtime. Do you know your last A1c measurement? Yes    Do you know the meaning of the A1c? No     Would benefit from [facility] related to Monitoring: Yes      Uses BG readings to establish trends and understand BG patterns: No      Stage of change: Preparation       Taking Medication  Current practices  Do you understand what your diabetes medications do?  No    How often do you miss doses of your diabetes medications? never, used to miss frequently    Can you afford your diabetes medications? Yes   Would benefit from [facility] related to Taking Medication: Yes      Takes medications consistently to receive full benefit: Yes      Stage of change: Action       Healthy Coping   Current state  Diabetes Skills, Confidence and Preparedness Index: Total score: 4.5  Skills: 3.1  Confidence: 4.9  Preparedness: 6       Would benefit from DSMES related to Healthy Coping: Yes      Identifies specific people, organizations,etc, that actively support their diabetes self-care efforts: Yes      Stage of change: Action     Reducing Risks  Current state  Vaccines:  Influenza: shared will get  Immunization History   Administered Date(s) Administered    Influenza Vaccine 11/20/2014, 11/03/2016    Influenza, FLUARIX, FLULAVAL, FLUZONE (age 10 mo+) AND AFLURIA, (age 1 y+), PF, 0.5mL 11/10/2017       Pneumococcal:   Immunization History   Administered Date(s) Administered    Pneumococcal Polysaccharide (PPSV-23) 11/10/2017        Hepatitis: No    Examinations:  Diabetic Foot and Eye Exam HM Status   Topic Date Due    Diabetic Foot Care  Never done    Eye Exam  October 2022        Dental exam: last appointment was: 2 years ago  Heart Protection:  BP Readings from Last 2 Encounters:   09/27/22 (!) 148/93   08/25/22 (!) 138/90        Lab Results   Component Value Date/Time    LDL, calculated 59 03/18/2016 08:54 AM        Kidney Protection:  Lab Results   Component Value Date/Time    Microalb/Creat ratio (ug/mg creat.) 3.6 03/18/2016 08:54 AM        Would benefit from [facility] related to Reducing Risks:  Yes      Actively participates in decision-making with provider regarding secondary prevention:  No      Stage of change: Action   Problem Solving  Current state  Hypoglycemia Management:  What are signs and symptoms of hypoglycemia that you experience: Pt reported being unaware of s/s of hypoglycemia    How do you prevent hypoglycemia: patient is unaware of how to treat low blood sugars    How do you treat hypoglycemia: Patient is unaware of how to treat hypoglycemia    Hyperglycemia Management:  What are signs and symptoms of hyperglycemia that you experience:  off balance,  blurry vision, thirsty, increased urination    How can you prevent hyperglycemia: patient is unaware of how to prevent high blood sugars    Sick Day Management:  What do you do differently on sick days:  Pt reported being unaware of self-management on sick days    Pattern Management:  Do you notice blood glucose patterns when you look at the readings in your meter or logbook? Not checking BG    How do you use the blood glucose readings from your meter or logbook? Not checking BG     Would benefit from [facility] related to Problem Solving: Yes      Articulates appropriate strategies to address hypoglycemia, hyperglycemia, sick day care and BG pattern: No      Stage of change: Action       Note: Content derived from the American Association of Diabetes Educators' Diabetes Education Curriculum: A Guide to Successful Self-Management (3rd edition)      Hawk Mckeon RN on 10/28/2022 at 3:11 PM    I have personally reviewed the health record, including provider notes, laboratory data and current medications before making these care and education recommendations. The time spent in this effort is included in the total time.   Total minutes: 30      Overall SCPI score: 4.5 Skills Score: 3.1  Low: Healthy Eating(Q1),Taking Medication(Q2),Blood Sugar Monitoring(Q4),Reducing Risks(Q5),Problem Solving(Q6),Healthy Coping(Q7) Confidence Score: 4.9  Low: Reducing Risks(Q3) Preparedness Score: 6.0  Low: Healthy Eating(Q1),Being Active(Q2),Healthy Coping(Q3),Reducing Risks(Q4),Blood Sugar Monitoring(Q6),Problem Solving(Q7)  Healthy Eating Score: 5.0  Low: Skills(Q1) Taking Medication Score: 2.0  Low: Skills(Q2) Blood Sugar Monitoring Score: 4.8  Low: WLRTRL(L5) Reducing Risks Score: 3.8  Low: Skills(Q5),Confidence(Q3)  Problem Solving Score: 4.3  Low: Skills(Q6) Healthy Coping Score: 4.3  Low: [id number]) Being Active Score: 5.5  Low: Confidence(Q5)    Skills/Knowledge Questions  1. I know how to plan meals that have the best balance between carbohydrates, proteins and vegetables. 2  2. I know how my diabetes medications (pills, injectables and/or insulin) work in my body. 2  3. I know when to check my blood sugar if I want to see how my body responded to a meal. 6  4. I know when to check my blood sugars to determine if my medication or insulin doses are correct. 2  5. I know what to do to prevent a low blood sugar when I exercise (either before, during, or after). 2  6. When I am sick, I know what to do differently with my diabetes management. 2  7. I know how stress can affect my diabetes management. 2  8. When I look at my blood sugars over a given week, I can explain what my blood sugar pattern is. 5  9. I know what my target levels are for A1c, blood pressure and cholesterol. 5  Confidence Questions  1. I am confident that I can plan balanced meals and snacks. 6  2. I am confident that I can manage my stress. 5  3. I am confident that I can prevent a low blood sugar during or after exercise. 2  4. I am confident that the next time I eat out, I will be able to choose foods that best keep my blood sugars in target. 6  5. I am confident I can include exercise into my schedule. 5  6. I am confident that I can use my daily blood sugars to adjust my diet, my activity, and/or my insulin. 5  7. When something out of my normal routine happens, I am confident that I can problem-solve and keep my diabetes on track. 5  Preparedness Questions  1. Within the next month, I will begin to eat more balanced meals and snacks. 6  2. Within the next month, I will choose an exercise activity and I will start fitting it into my schedule. 6  3. Within the next month, I will make a list of stress management options that work for me. 6  4.  Within the next month, I will consistently plan ahead to prevent low blood sugars. 6  5. Within the next month, I will start adjusting my insulin doses on my own. 0  6. Within the next month, I will begin making changes to my diabetes management based on my daily blood sugars (eg - eating, activity and/or insulin). 6  7. Within the next month, I will begin making changes to my diabetes management to meet my overall goals (eg - eating, activity and/or insulin).  6

## 2022-11-17 ENCOUNTER — CLINICAL SUPPORT (OUTPATIENT)
Dept: DIABETES SERVICES | Age: 52
End: 2022-11-17
Payer: COMMERCIAL

## 2022-11-17 DIAGNOSIS — E11.65 TYPE 2 DIABETES MELLITUS WITH HYPERGLYCEMIA, UNSPECIFIED WHETHER LONG TERM INSULIN USE (HCC): Primary | ICD-10-CM

## 2022-11-17 PROCEDURE — G0108 DIAB MANAGE TRN  PER INDIV: HCPCS

## 2022-11-17 NOTE — PROGRESS NOTES
[facility] for Diabetes Health  Diabetes Self-Management Education & Support Program  Encounter Note    SUMMARY  Diabetes self-care management training was completed related to What is Diabetes? And reducing risks. The participant will return on December 8 to continue DSMES related to reducing risks and healthy eating. The participant did identify SMART Goal(s) and will practice knowledge and skills related to reducing risks to improve diabetes self-management. EVALUATION:  Mr Lorri Dubon expressed understanding of T 2 DM disease process & the ADA targets for BG & A1c. Expressed understanding of the role of vaccinations, eye, dental & foot exams in preventing DM complications. Harjinder Pablo is using a [employer]iva[employer]Cell 2 CGM and reports time in target 86%. He needs to schedule dental & podiatry appointments. He shared has some loss of sensation and pain to feet. RECOMMENDATIONS:  Schedule appointments as above. Track self care practices to include BG, meals & exercise. TOPICS DISCUSSED TODAY:  WHAT IS DIABETES? Minutes: [place]? 30       SMART GOAL(S)   Schedule podiatry & dental appointments in the next month (goal set 11/17/22)  ACHIEVEMENT OF GOAL(S) : 0-24%       DATE DSMES TOPIC EVALUATION     11/17/2022 WHAT IS DIABETES? Role of the normal pancreas in energy balance and blood glucose control   The defect seen in diabetes   Signs & symptoms of diabetes   Diagnosis of diabetes   Types of diabetes   Blood glucose targets in non-pregnant & non-pregnant adults       The participant knows  Their type of diabetes: Yes  The basic physiologic defect: Yes  Blood glucose targets: Yes     DATE DSMES TOPIC EVALUATION     11/17/2022 HOW DO I STAY HEALTHY?    Prevention   Vaccinations   Preconception care (if applicable)  Examinations   Eye    Foot   Diabetic complications' prevention   Dental health   Heart health   Kidney Health   Nerve health   Sleep health      The participant has a personal diabetes care record to keep abreast of diabetes health Ana Maria Cullen RN on 11/17/2022 at 4:48 PM    I have personally reviewed the health record, including provider notes, laboratory data and current medications before making these care and education recommendations. The time spent in this effort is included in the total time.   Total minutes: 60

## 2022-12-08 ENCOUNTER — CLINICAL SUPPORT (OUTPATIENT)
Dept: DIABETES SERVICES | Age: 52
End: 2022-12-08
Payer: COMMERCIAL

## 2022-12-08 DIAGNOSIS — E11.65 TYPE 2 DIABETES MELLITUS WITH HYPERGLYCEMIA, UNSPECIFIED WHETHER LONG TERM INSULIN USE (HCC): Primary | ICD-10-CM

## 2022-12-08 PROCEDURE — G0108 DIAB MANAGE TRN  PER INDIV: HCPCS

## 2022-12-13 NOTE — PROGRESS NOTES
[facility] for Diabetes Health  Diabetes Self-Management Education & Support Program  Encounter Note    SUMMARY  Diabetes self-care management training was completed related to reducing risks. The participant will return on December 29 to continue DSMES related to healthy eating. The participant did not identify SMART Goal(s) and will practice knowledge and skills related to reducing risks to improve diabetes self-management. EVALUATION:  Mr Lonnie Joy expressed understanding of the relationship between DM & heart, kidney, eye, nerve & sleep health. He shared has sleep apnea and not using CPAP. We did a foot exam during today's session. Stuart Smith shared his BG time in range is 77 % over last 7 days. RECOMMENDATIONS:  Use CPAP machine every night. Contact provider to discuss obtaining a different model CPAP machine if needed. Track self care practices to include BG, meals & exercise. TOPICS DISCUSSED TODAY:  HOW DO I STAY HEALTHY? 61       SMART GOAL(S)   Schedule podiatry & dental appointments   ACHIEVEMENT OF GOAL(S) : 0-24%       DATE DSMES TOPIC EVALUATION     12/13/2022 HOW DO I STAY HEALTHY? Prevention   Vaccinations   Preconception care (if applicable)  Examinations   Eye    Foot   Diabetic complications' prevention   Dental health   Heart health   Kidney Health   Nerve health   Sleep health      The participant has a personal diabetes care record to keep abreast of diabetes health No     The participant needs to address keeping track of diabetes health using a personal care record. Lynda Shah RN on 12/13/2022 at 12:46 PM    I have personally reviewed the health record, including provider notes, laboratory data and current medications before making these care and education recommendations. The time spent in this effort is included in the total time.   Total minutes: 60

## 2023-04-27 ENCOUNTER — HOSPITAL ENCOUNTER (OUTPATIENT)
Dept: CT IMAGING | Age: 53
Discharge: HOME | End: 2023-04-27
Attending: FAMILY MEDICINE
Payer: COMMERCIAL

## 2023-04-27 DIAGNOSIS — R10.9 ABDOMINAL PAIN: ICD-10-CM

## 2023-04-27 PROCEDURE — 74011000636 HC RX REV CODE- 636: Performed by: FAMILY MEDICINE

## 2023-04-27 PROCEDURE — 74177 CT ABD & PELVIS W/CONTRAST: CPT

## 2023-04-27 RX ADMIN — IOPAMIDOL 100 ML: 755 INJECTION, SOLUTION INTRAVENOUS at 06:42

## 2023-07-28 ENCOUNTER — HOSPITAL ENCOUNTER (EMERGENCY)
Facility: HOSPITAL | Age: 53
Discharge: HOME | End: 2023-07-28
Attending: STUDENT IN AN ORGANIZED HEALTH CARE EDUCATION/TRAINING PROGRAM
Payer: COMMERCIAL

## 2023-07-28 ENCOUNTER — APPOINTMENT (OUTPATIENT)
Facility: HOSPITAL | Age: 53
End: 2023-07-28
Payer: COMMERCIAL

## 2023-07-28 VITALS
SYSTOLIC BLOOD PRESSURE: 159 MMHG | HEIGHT: 68 IN | RESPIRATION RATE: 18 BRPM | WEIGHT: 210 LBS | HEART RATE: 75 BPM | DIASTOLIC BLOOD PRESSURE: 95 MMHG | OXYGEN SATURATION: 97 % | BODY MASS INDEX: 31.83 KG/M2 | TEMPERATURE: 97.8 F

## 2023-07-28 DIAGNOSIS — R07.9 CHEST PAIN, UNSPECIFIED TYPE: Primary | ICD-10-CM

## 2023-07-28 LAB
ALBUMIN SERPL-MCNC: 4.2 G/DL (ref 3.5–5)
ALBUMIN/GLOB SERPL: 1.1 (ref 1.1–2.2)
ALP SERPL-CCNC: 61 U/L (ref 45–117)
ALT SERPL-CCNC: 49 U/L (ref 12–78)
ANION GAP SERPL CALC-SCNC: 3 MMOL/L (ref 5–15)
AST SERPL-CCNC: 44 U/L (ref 15–37)
BASOPHILS # BLD: 0.1 K/UL (ref 0–0.1)
BASOPHILS NFR BLD: 1 % (ref 0–1)
BILIRUB SERPL-MCNC: 0.5 MG/DL (ref 0.2–1)
BUN SERPL-MCNC: 11 MG/DL (ref 6–20)
BUN/CREAT SERPL: 13 (ref 12–20)
CALCIUM SERPL-MCNC: 9.5 MG/DL (ref 8.5–10.1)
CHLORIDE SERPL-SCNC: 103 MMOL/L (ref 97–108)
CO2 SERPL-SCNC: 27 MMOL/L (ref 21–32)
COMMENT:: NORMAL
CREAT SERPL-MCNC: 0.88 MG/DL (ref 0.7–1.3)
D DIMER PPP FEU-MCNC: <0.19 MG/L FEU (ref 0–0.65)
DIFFERENTIAL METHOD BLD: NORMAL
EKG ATRIAL RATE: 85 BPM
EKG DIAGNOSIS: NORMAL
EKG P AXIS: 23 DEGREES
EKG P-R INTERVAL: 140 MS
EKG Q-T INTERVAL: 350 MS
EKG QRS DURATION: 82 MS
EKG QTC CALCULATION (BAZETT): 416 MS
EKG R AXIS: 81 DEGREES
EKG T AXIS: -4 DEGREES
EKG VENTRICULAR RATE: 85 BPM
EOSINOPHIL # BLD: 0.1 K/UL (ref 0–0.4)
EOSINOPHIL NFR BLD: 1 % (ref 0–7)
ERYTHROCYTE [DISTWIDTH] IN BLOOD BY AUTOMATED COUNT: 11.9 % (ref 11.5–14.5)
GLOBULIN SER CALC-MCNC: 3.7 G/DL (ref 2–4)
GLUCOSE SERPL-MCNC: 101 MG/DL (ref 65–100)
HCT VFR BLD AUTO: 44.3 % (ref 36.6–50.3)
HGB BLD-MCNC: 14 G/DL (ref 12.1–17)
IMM GRANULOCYTES # BLD AUTO: 0 K/UL (ref 0–0.04)
IMM GRANULOCYTES NFR BLD AUTO: 0 % (ref 0–0.5)
LYMPHOCYTES # BLD: 2.2 K/UL (ref 0.8–3.5)
LYMPHOCYTES NFR BLD: 24 % (ref 12–49)
MCH RBC QN AUTO: 29.4 PG (ref 26–34)
MCHC RBC AUTO-ENTMCNC: 31.6 G/DL (ref 30–36.5)
MCV RBC AUTO: 92.9 FL (ref 80–99)
MONOCYTES # BLD: 0.6 K/UL (ref 0–1)
MONOCYTES NFR BLD: 7 % (ref 5–13)
NEUTS SEG # BLD: 6.3 K/UL (ref 1.8–8)
NEUTS SEG NFR BLD: 67 % (ref 32–75)
NRBC # BLD: 0 K/UL (ref 0–0.01)
NRBC BLD-RTO: 0 PER 100 WBC
PLATELET # BLD AUTO: 335 K/UL (ref 150–400)
PMV BLD AUTO: 10.7 FL (ref 8.9–12.9)
POTASSIUM SERPL-SCNC: 4.3 MMOL/L (ref 3.5–5.1)
POTASSIUM SERPL-SCNC: 5.4 MMOL/L (ref 3.5–5.1)
PROT SERPL-MCNC: 7.9 G/DL (ref 6.4–8.2)
RBC # BLD AUTO: 4.77 M/UL (ref 4.1–5.7)
SODIUM SERPL-SCNC: 133 MMOL/L (ref 136–145)
SPECIMEN HOLD: NORMAL
TROPONIN I SERPL HS-MCNC: 6 NG/L (ref 0–76)
TROPONIN I SERPL HS-MCNC: 6 NG/L (ref 0–76)
WBC # BLD AUTO: 9.3 K/UL (ref 4.1–11.1)

## 2023-07-28 PROCEDURE — 84484 ASSAY OF TROPONIN QUANT: CPT

## 2023-07-28 PROCEDURE — 6370000000 HC RX 637 (ALT 250 FOR IP): Performed by: FAMILY MEDICINE

## 2023-07-28 PROCEDURE — 85379 FIBRIN DEGRADATION QUANT: CPT

## 2023-07-28 PROCEDURE — 80053 COMPREHEN METABOLIC PANEL: CPT

## 2023-07-28 PROCEDURE — 99285 EMERGENCY DEPT VISIT HI MDM: CPT

## 2023-07-28 PROCEDURE — 85025 COMPLETE CBC W/AUTO DIFF WBC: CPT

## 2023-07-28 PROCEDURE — 36415 COLL VENOUS BLD VENIPUNCTURE: CPT

## 2023-07-28 PROCEDURE — 84132 ASSAY OF SERUM POTASSIUM: CPT

## 2023-07-28 PROCEDURE — 71046 X-RAY EXAM CHEST 2 VIEWS: CPT

## 2023-07-28 RX ORDER — METHOCARBAMOL 750 MG/1
750 TABLET, FILM COATED ORAL 4 TIMES DAILY
Qty: 40 TABLET | Refills: 0 | Status: SHIPPED | OUTPATIENT
Start: 2023-07-28 | End: 2023-08-07

## 2023-07-28 RX ORDER — ASPIRIN 81 MG/1
324 TABLET, CHEWABLE ORAL
Status: COMPLETED | OUTPATIENT
Start: 2023-07-28 | End: 2023-07-28

## 2023-07-28 RX ORDER — METHOCARBAMOL 500 MG/1
750 TABLET, FILM COATED ORAL
Status: COMPLETED | OUTPATIENT
Start: 2023-07-28 | End: 2023-07-28

## 2023-07-28 RX ADMIN — ASPIRIN 81 MG CHEWABLE TABLET 324 MG: 81 TABLET CHEWABLE at 21:03

## 2023-07-28 RX ADMIN — METHOCARBAMOL TABLETS 750 MG: 500 TABLET, COATED ORAL at 21:03

## 2023-07-28 ASSESSMENT — PAIN SCALES - GENERAL: PAINLEVEL_OUTOF10: 7

## 2023-07-28 ASSESSMENT — ENCOUNTER SYMPTOMS
VOMITING: 0
NAUSEA: 0
COUGH: 0
BACK PAIN: 0
SHORTNESS OF BREATH: 0
ABDOMINAL PAIN: 0

## 2023-07-28 ASSESSMENT — PAIN DESCRIPTION - ORIENTATION: ORIENTATION: LEFT

## 2023-07-28 ASSESSMENT — PAIN - FUNCTIONAL ASSESSMENT: PAIN_FUNCTIONAL_ASSESSMENT: 0-10

## 2023-07-28 ASSESSMENT — PAIN DESCRIPTION - LOCATION: LOCATION: CHEST

## 2023-07-28 ASSESSMENT — PAIN DESCRIPTION - DESCRIPTORS: DESCRIPTORS: CRAMPING;DULL

## 2023-07-28 NOTE — ED TRIAGE NOTES
Patient is here for chest pain and diaphoresis since this morning. This pain has been on and off every hour since it began. No cardiac hx. Ambulatory to triage.

## 2023-07-28 NOTE — ED PROVIDER NOTES
[facility] EMERGENCY DEP  EMERGENCY DEPARTMENT ENCOUNTER      Pt Name: Chris Baez  MRN: [id number]  [address] [facility] [place] 1970  Date of evaluation: 7/28/2023  Provider: BÁRBARA Grady NP    CHIEF COMPLAINT     Chest Pain        HISTORY OF PRESENT ILLNESS   (Location/Symptom, Timing/Onset, Context/Setting, Quality, Duration, Modifying Factors, Severity)  Note limiting factors. Patient is a 63-year-old male with past medical history of hypertension, hyperlipidemia, diabetes, anxiety presenting to the emergency department for evaluation of chest pain. Reports the chest pain is left-sided in nature and seems to come and go intermittently without known aggravating or relieving factors, approximately occurring once per hour. Reports that pain originally started when he was trying to have a bowel movement. Denies any radiation of pain to his back or neck. Denies any associated shortness of breath. Denies any known history of coronary artery disease. No associated nausea or vomiting. Does report intermittent diaphoresis. No other complaints. The history is provided by the patient. Review of External Medical Records:     Nursing Notes were reviewed. REVIEW OF SYSTEMS    (2-9 systems for level 4, 10 or more for level 5)     Review of Systems   Constitutional:  Positive for diaphoresis. Negative for unexpected weight change. HENT:  Negative for congestion. Eyes:  Negative for visual disturbance. Respiratory:  Negative for cough and shortness of breath. Cardiovascular:  Positive for chest pain. Negative for palpitations. Gastrointestinal:  Negative for abdominal pain, nausea and vomiting. Endocrine: Negative for polyuria. Genitourinary:  Negative for dysuria and flank pain. Musculoskeletal:  Negative for back pain. Skin:  Negative for pallor. Allergic/Immunologic: Negative for immunocompromised state. Neurological:  Negative for dizziness and headaches.    Hematological:

## 2023-07-29 ASSESSMENT — HEART SCORE: ECG: 1

## 2023-07-31 LAB
EKG ATRIAL RATE: 85 BPM
EKG DIAGNOSIS: NORMAL
EKG P AXIS: 23 DEGREES
EKG P-R INTERVAL: 140 MS
EKG Q-T INTERVAL: 350 MS
EKG QRS DURATION: 82 MS
EKG QTC CALCULATION (BAZETT): 416 MS
EKG R AXIS: 81 DEGREES
EKG T AXIS: -4 DEGREES
EKG VENTRICULAR RATE: 85 BPM

## 2023-10-19 ENCOUNTER — TELEMEDICINE (OUTPATIENT)
Age: 53
End: 2023-10-19
Payer: COMMERCIAL

## 2023-10-19 ENCOUNTER — TELEPHONE (OUTPATIENT)
Age: 53
End: 2023-10-19

## 2023-10-19 DIAGNOSIS — I10 PRIMARY HYPERTENSION: ICD-10-CM

## 2023-10-19 DIAGNOSIS — E11.9 DIABETES MELLITUS WITHOUT COMPLICATION (HCC): ICD-10-CM

## 2023-10-19 DIAGNOSIS — G47.33 OBSTRUCTIVE SLEEP APNEA (ADULT) (PEDIATRIC): Primary | ICD-10-CM

## 2023-10-19 PROCEDURE — 99204 OFFICE O/P NEW MOD 45 MIN: CPT | Performed by: INTERNAL MEDICINE

## 2023-10-19 ASSESSMENT — SLEEP AND FATIGUE QUESTIONNAIRES
HOW LIKELY ARE YOU TO NOD OFF OR FALL ASLEEP WHILE SITTING AND TALKING TO SOMEONE: 0
HOW LIKELY ARE YOU TO NOD OFF OR FALL ASLEEP IN A CAR, WHILE STOPPED FOR A FEW MINUTES IN TRAFFIC: 1
ESS TOTAL SCORE: 14
HOW LIKELY ARE YOU TO NOD OFF OR FALL ASLEEP WHILE SITTING QUIETLY AFTER LUNCH WITHOUT ALCOHOL: 3
HOW LIKELY ARE YOU TO NOD OFF OR FALL ASLEEP WHILE WATCHING TV: 1
HOW LIKELY ARE YOU TO NOD OFF OR FALL ASLEEP WHILE SITTING AND READING: 0
HOW LIKELY ARE YOU TO NOD OFF OR FALL ASLEEP WHEN YOU ARE A PASSENGER IN A CAR FOR AN HOUR WITHOUT A BREAK: 3
HOW LIKELY ARE YOU TO NOD OFF OR FALL ASLEEP WHILE LYING DOWN TO REST IN THE AFTERNOON WHEN CIRCUMSTANCES PERMIT: 3
HOW LIKELY ARE YOU TO NOD OFF OR FALL ASLEEP WHILE SITTING INACTIVE IN A PUBLIC PLACE: 3

## 2023-10-19 NOTE — PATIENT INSTRUCTIONS
label. Alcohol naturally makes you sleepy and on its own can cause accidents. Combined with excessive drowsiness its effects are amplified. Signs of Drowsy Driving:   * You don't remember driving the last few miles   * You may drift out of your donald   * You are unable to focus and your thoughts wander   * You may yawn more often than normal   * You have difficulty keeping your eyes open / nodding off   * Missing traffic signs, speeding, or tailgating  Prevention-   Good sleep hygiene, lifestyle and behavioral choices have the most impact on drowsy driving. There is no substitute for sleep and the average person requires 8 hours nightly. If you find yourself driving drowsy, stop and sleep. Consider the sleep hygiene tips provided during your visit as well. Medication Refill Policy: Refills for all medications require 1 week advance notice. Please have your pharmacy fax a refill request. We are unable to fax, or call in \"controled substance\" medications and you will need to pick these prescriptions up from our office.

## 2023-10-19 NOTE — PROGRESS NOTES
Gilda Jhaveri is a 48 y.o. male who was seen by synchronous (real-time) audio-video technology on 10/19/2023. Consent:  He and/or his healthcare decision maker is aware that this patient-initiated Telehealth encounter is a billable service, with coverage as determined by his insurance carrier. He is aware that he may receive a bill and has provided verbal consent to proceed: Yes       The patient was located at Home: [address] [place] [address] Road [address]. The provider was located at Home ([address] [place] [address]): Abhinav Escobedo, [address]  Tel.  [phone]  Fax. [address]  Tel.  [phone]  Fax. [phone] [address]  Tel.  [phone]  Fax. [phone]        Patient called and identity confirmed with 2 patient identifers     Gilda Jhaveri is a 48 y.o. male who was seen by synchronous (real-time) audio-video technology on 10/19/2023. --Redd Virk MD on 10/19/2023 at 1:55 PM                                 [address]  Tel.  [phone]  Fax. [facility], [facility] [address]  Tel.  [phone]  Fax. [phone] [address]  Tel.  [phone]  Fax. [phone]         Subjective:             Gilda Jhaveri is an 48 y.o. male  referred by JONELLE Jorge,  for evaluation for a sleep disorder. He complains of snoring, choking associated with excessive daytime sleepiness, difficulty staying asleep. Symptoms began a few years ago, gradually worsening since that time. He usually can fall asleep in 30 minutes. Family or house members note snoring, snorting. He denies falling asleep while at work, driving. Gilda Jhaveri does wake up frequently at night. He is bothered by waking up too early and left unable to get back to sleep.  He

## 2023-11-08 ENCOUNTER — HOSPITAL ENCOUNTER (OUTPATIENT)
Facility: HOSPITAL | Age: 53
Discharge: HOME | End: 2023-11-11
Payer: COMMERCIAL

## 2023-11-08 ENCOUNTER — PROCEDURE VISIT (OUTPATIENT)
Age: 53
End: 2023-11-08

## 2023-11-08 DIAGNOSIS — G47.33 OSA (OBSTRUCTIVE SLEEP APNEA): Primary | ICD-10-CM

## 2023-11-08 DIAGNOSIS — G47.33 OBSTRUCTIVE SLEEP APNEA (ADULT) (PEDIATRIC): ICD-10-CM

## 2023-11-08 PROCEDURE — G0400 HOME SLEEP TEST/TYPE 4 PORTA: HCPCS | Performed by: INTERNAL MEDICINE

## 2023-11-08 NOTE — PROGRESS NOTES
Rupert Zabala is seen today to receive a WatchPAT home sleep apnea testing (HSAT) device. The WatchPAT HSAT Agreement was reviewed and endorsed by patient. General information regarding operation of the HSAT device was provided. Patient was advised to watch the [facility] instructional video. Patient was advised to contact patient support for any problems using the device. Patient was advised to complete the Morning Questionnaire after awakening/ending the test.    There were no vitals taken for this visit. Patient will return the home sleep testing unit by noon unless otherwise approved. Patient will be contacted once the results have been reviewed by the physician, typically within 10-15 business days. Patient also complained of his current pillow mask irritating his nose. Patient was fitted with a N30i medium nasal mask. [email or web address] Serial Number [id number].

## 2023-11-09 ENCOUNTER — PROCEDURE VISIT (OUTPATIENT)
Age: 53
End: 2023-11-09

## 2023-11-09 DIAGNOSIS — G47.33 OSA (OBSTRUCTIVE SLEEP APNEA): Primary | ICD-10-CM

## 2023-11-09 NOTE — PROGRESS NOTES
Patient returned the [facility] home sleep apnea test (HSAT) device intact and without evident damage. The step ny step guide is not in the case, but patient stated that he did not receive one. Patient will receive the results of the test within 10-15 business days.

## 2023-11-14 ENCOUNTER — TELEPHONE (OUTPATIENT)
Age: 53
End: 2023-11-14

## 2023-11-14 DIAGNOSIS — G47.33 OBSTRUCTIVE SLEEP APNEA (ADULT) (PEDIATRIC): Primary | ICD-10-CM

## 2023-11-17 NOTE — TELEPHONE ENCOUNTER
HSAT in r-drive. Tech to convey results to patient    Roseanna Orozco positive for significant sleep apnea. AHI 27/hour and lowest oxygen saturation was 77%. We had discussed treatment options at initial consultation. Based on the results of the home sleep apnea test,  a trial of APAP would be an effective mode of therapy. APAP order attached. he should be seen in the sleep disorder center 4-6 weeks after initiating PAP therapy. his respiratory events were worse when sleeping on back. he should avoid sleeping on his back until he is on PAP therapy. Patient should call the office the day he gets set up with new PAP device so we can schedule him for an adherence/compliance visit within 31-90 days of obtaining a new device. Front staff to Order PAP and call patient and let them know which DME company they should be hearing from after results reviewed with lead support technologist.     he will need a first adherence visit.

## 2023-11-29 ENCOUNTER — APPOINTMENT (OUTPATIENT)
Facility: HOSPITAL | Age: 53
End: 2023-11-29
Payer: COMMERCIAL

## 2023-11-29 ENCOUNTER — HOSPITAL ENCOUNTER (EMERGENCY)
Facility: HOSPITAL | Age: 53
Discharge: HOME | End: 2023-11-29
Attending: EMERGENCY MEDICINE
Payer: COMMERCIAL

## 2023-11-29 VITALS
DIASTOLIC BLOOD PRESSURE: 95 MMHG | HEART RATE: 85 BPM | OXYGEN SATURATION: 100 % | SYSTOLIC BLOOD PRESSURE: 170 MMHG | HEIGHT: 69 IN | BODY MASS INDEX: 29.62 KG/M2 | TEMPERATURE: 97.9 F | RESPIRATION RATE: 18 BRPM | WEIGHT: 200 LBS

## 2023-11-29 DIAGNOSIS — R10.84 GENERALIZED ABDOMINAL PAIN: Primary | ICD-10-CM

## 2023-11-29 DIAGNOSIS — K59.00 CONSTIPATION, UNSPECIFIED CONSTIPATION TYPE: ICD-10-CM

## 2023-11-29 LAB
ALBUMIN SERPL-MCNC: 4 G/DL (ref 3.5–5)
ALBUMIN/GLOB SERPL: 1 (ref 1.1–2.2)
ALP SERPL-CCNC: 76 U/L (ref 45–117)
ALT SERPL-CCNC: 79 U/L (ref 12–78)
ANION GAP SERPL CALC-SCNC: 12 MMOL/L (ref 5–15)
AST SERPL-CCNC: 22 U/L (ref 15–37)
BASOPHILS # BLD: 0 K/UL (ref 0–0.1)
BASOPHILS NFR BLD: 0 % (ref 0–1)
BILIRUB SERPL-MCNC: 0.7 MG/DL (ref 0.2–1)
BUN SERPL-MCNC: 8 MG/DL (ref 6–20)
BUN/CREAT SERPL: 8 (ref 12–20)
CALCIUM SERPL-MCNC: 9 MG/DL (ref 8.5–10.1)
CHLORIDE SERPL-SCNC: 98 MMOL/L (ref 97–108)
CO2 SERPL-SCNC: 25 MMOL/L (ref 21–32)
COMMENT:: NORMAL
CREAT SERPL-MCNC: 0.97 MG/DL (ref 0.7–1.3)
D DIMER PPP FEU-MCNC: <0.19 MG/L FEU (ref 0–0.65)
DIFFERENTIAL METHOD BLD: ABNORMAL
EOSINOPHIL # BLD: 0 K/UL (ref 0–0.4)
EOSINOPHIL NFR BLD: 0 % (ref 0–7)
ERYTHROCYTE [DISTWIDTH] IN BLOOD BY AUTOMATED COUNT: 11.3 % (ref 11.5–14.5)
GLOBULIN SER CALC-MCNC: 4 G/DL (ref 2–4)
GLUCOSE SERPL-MCNC: 150 MG/DL (ref 65–100)
HCT VFR BLD AUTO: 43.2 % (ref 36.6–50.3)
HGB BLD-MCNC: 13.8 G/DL (ref 12.1–17)
IMM GRANULOCYTES # BLD AUTO: 0.1 K/UL (ref 0–0.04)
IMM GRANULOCYTES NFR BLD AUTO: 1 % (ref 0–0.5)
LYMPHOCYTES # BLD: 1.8 K/UL (ref 0.8–3.5)
LYMPHOCYTES NFR BLD: 14 % (ref 12–49)
MCH RBC QN AUTO: 29.1 PG (ref 26–34)
MCHC RBC AUTO-ENTMCNC: 31.9 G/DL (ref 30–36.5)
MCV RBC AUTO: 90.9 FL (ref 80–99)
MONOCYTES # BLD: 0.9 K/UL (ref 0–1)
MONOCYTES NFR BLD: 7 % (ref 5–13)
NEUTS SEG # BLD: 10.2 K/UL (ref 1.8–8)
NEUTS SEG NFR BLD: 78 % (ref 32–75)
NRBC # BLD: 0 K/UL (ref 0–0.01)
NRBC BLD-RTO: 0 PER 100 WBC
PLATELET # BLD AUTO: 277 K/UL (ref 150–400)
PMV BLD AUTO: 10.6 FL (ref 8.9–12.9)
POTASSIUM SERPL-SCNC: 4.1 MMOL/L (ref 3.5–5.1)
PROT SERPL-MCNC: 8 G/DL (ref 6.4–8.2)
RBC # BLD AUTO: 4.75 M/UL (ref 4.1–5.7)
SODIUM SERPL-SCNC: 135 MMOL/L (ref 136–145)
SPECIMEN HOLD: NORMAL
TROPONIN I SERPL HS-MCNC: 7 NG/L (ref 0–76)
WBC # BLD AUTO: 13.1 K/UL (ref 4.1–11.1)

## 2023-11-29 PROCEDURE — 80053 COMPREHEN METABOLIC PANEL: CPT

## 2023-11-29 PROCEDURE — 36415 COLL VENOUS BLD VENIPUNCTURE: CPT

## 2023-11-29 PROCEDURE — 85379 FIBRIN DEGRADATION QUANT: CPT

## 2023-11-29 PROCEDURE — 6360000004 HC RX CONTRAST MEDICATION: Performed by: EMERGENCY MEDICINE

## 2023-11-29 PROCEDURE — 93005 ELECTROCARDIOGRAM TRACING: CPT | Performed by: EMERGENCY MEDICINE

## 2023-11-29 PROCEDURE — 99285 EMERGENCY DEPT VISIT HI MDM: CPT

## 2023-11-29 PROCEDURE — 85025 COMPLETE CBC W/AUTO DIFF WBC: CPT

## 2023-11-29 PROCEDURE — 74177 CT ABD & PELVIS W/CONTRAST: CPT

## 2023-11-29 PROCEDURE — 84484 ASSAY OF TROPONIN QUANT: CPT

## 2023-11-29 RX ORDER — MAGNESIUM CITRATE
150 SOLUTION, ORAL ORAL ONCE
Qty: 150 ML | Refills: 0 | Status: SHIPPED | OUTPATIENT
Start: 2023-11-29 | End: 2023-11-29 | Stop reason: SDUPTHER

## 2023-11-29 RX ORDER — ONDANSETRON 4 MG/1
4 TABLET, ORALLY DISINTEGRATING ORAL 3 TIMES DAILY PRN
Qty: 21 TABLET | Refills: 0 | Status: SHIPPED | OUTPATIENT
Start: 2023-11-29 | End: 2023-11-29 | Stop reason: SDUPTHER

## 2023-11-29 RX ORDER — MAGNESIUM CITRATE
150 SOLUTION, ORAL ORAL ONCE
Qty: 150 ML | Refills: 0 | Status: SHIPPED | OUTPATIENT
Start: 2023-11-29 | End: 2023-11-29

## 2023-11-29 RX ORDER — ONDANSETRON 4 MG/1
4 TABLET, ORALLY DISINTEGRATING ORAL 3 TIMES DAILY PRN
Qty: 21 TABLET | Refills: 0 | Status: SHIPPED | OUTPATIENT
Start: 2023-11-29

## 2023-11-29 RX ORDER — DOCUSATE SODIUM 100 MG/1
100 CAPSULE, LIQUID FILLED ORAL 2 TIMES DAILY
Qty: 60 CAPSULE | Refills: 0 | Status: SHIPPED | OUTPATIENT
Start: 2023-11-29 | End: 2023-12-29

## 2023-11-29 RX ORDER — DOCUSATE SODIUM 100 MG/1
100 CAPSULE, LIQUID FILLED ORAL 2 TIMES DAILY
Qty: 60 CAPSULE | Refills: 0 | Status: SHIPPED | OUTPATIENT
Start: 2023-11-29 | End: 2023-11-29 | Stop reason: SDUPTHER

## 2023-11-29 RX ADMIN — IOPAMIDOL 100 ML: 755 INJECTION, SOLUTION INTRAVENOUS at 20:56

## 2023-11-29 ASSESSMENT — PAIN DESCRIPTION - DESCRIPTORS: DESCRIPTORS: SHARP

## 2023-11-29 ASSESSMENT — PAIN DESCRIPTION - ORIENTATION: ORIENTATION: MID

## 2023-11-29 ASSESSMENT — PAIN - FUNCTIONAL ASSESSMENT
PAIN_FUNCTIONAL_ASSESSMENT: NONE - DENIES PAIN
PAIN_FUNCTIONAL_ASSESSMENT: 0-10

## 2023-11-29 ASSESSMENT — PAIN DESCRIPTION - LOCATION: LOCATION: ABDOMEN;CHEST

## 2023-11-29 ASSESSMENT — PAIN SCALES - GENERAL: PAINLEVEL_OUTOF10: 10

## 2023-11-29 NOTE — ED TRIAGE NOTES
Mid sternal chest pain x2 weeks worsening since coughing hard 2 days ago pain on left side and reports upper right and left quadrant abdominal pain with increased shortness of breath

## 2023-11-30 NOTE — ED NOTES
Discharge instructions were given to the patient by Dinorah Gomez. The patient left the Emergency Department ambulatory, alert and oriented and in no acute distress with 3 prescriptions. The patient was encouraged to call or return to the ED for worsening issues or problems and was encouraged to schedule a follow up appointment for continuing care. The patient verbalized understanding of discharge instructions and prescriptions, all questions were answered. The patient has no further concerns at this time.         Jeffry Rich RN  11/29/23 [id number]

## 2023-11-30 NOTE — ED NOTES
Pt states abd that started a few days ago. Pt states pain has become more constant. Pt denies chest pain. Pt states pain 7/10 at this time.       Kaiden Smith RN  11/29/23 1935

## 2023-11-30 NOTE — ED PROVIDER NOTES
[facility] EMERGENCY DEPT  EMERGENCY DEPARTMENT ENCOUNTER       Pt Name: Divya Her  MRN: [id number]  [address] 1970  Date of evaluation: 11/29/2023  Provider: Valerie Rendon MD   PCP: Dominique Nevarez MD  Note Started: 1:45 AM 11/29/23     CHIEF COMPLAINT       Chief Complaint   Patient presents with    Chest Pain    Abdominal Pain        HISTORY OF PRESENT ILLNESS: 1 or more elements      History From: patient, History limited by:  none     Divya Her is a 48 y.o. male who presents ambulatory to the ED complaining of right rib cage/right upper quadrant pain and now abdominal swelling and constipation for the last week. Patient explains that 3 weeks ago he sneezed hard and since then had rib pain. He thought he might of cracked rib due to sneezing. States his lungs and ribs have been hurting. [facility] and was told he probably had a contusion. Now he states his abdomen is swollen and he has diffuse abdominal tenderness. His last bowel movement was 2 days ago but that was only to little nuggets. His last for normal bowel movement was a week ago. Patient states he has been nauseated and his pain is worse with eating. Has not vomited. States he tried to eat soup today which made his pain worse. He has a known ventral hernia and that has not changed in size or become more painful. Also has a history of diabetes and high blood pressure. Denies prior abdominal surgery. Patient does note that he has been mildly more short of breath with exertion. He denies leg swelling. Denies shortness of breath. Nursing Notes were all reviewed and agreed with or any disagreements were addressed in the HPI. REVIEW OF SYSTEMS        Positives and Pertinent negatives as per HPI.     PAST HISTORY     Past Medical History:  Past Medical History:   Diagnosis Date    Acute right-sided low back pain without sciatica 11/4/2016    Anxiety 11/4/2016    Chronic pain     Contact

## 2023-12-01 LAB
EKG ATRIAL RATE: 86 BPM
EKG DIAGNOSIS: NORMAL
EKG P AXIS: 35 DEGREES
EKG P-R INTERVAL: 142 MS
EKG Q-T INTERVAL: 348 MS
EKG QRS DURATION: 86 MS
EKG QTC CALCULATION (BAZETT): 416 MS
EKG R AXIS: 27 DEGREES
EKG T AXIS: 30 DEGREES
EKG VENTRICULAR RATE: 86 BPM

## 2023-12-01 PROCEDURE — 93010 ELECTROCARDIOGRAM REPORT: CPT | Performed by: SPECIALIST

## 2024-02-05 ENCOUNTER — TELEPHONE (OUTPATIENT)
Age: 54
End: 2024-02-05

## 2024-02-05 NOTE — TELEPHONE ENCOUNTER
Patient called stated he has been sick for 1 week and hasn't been able to use the pap device.   Patient would like a call back with the progress of the compliance report.

## 2024-02-05 NOTE — TELEPHONE ENCOUNTER
Patient needs a first adherence visit.  He would like to discuss another therapy option other than CPAP.

## 2024-03-04 ASSESSMENT — SLEEP AND FATIGUE QUESTIONNAIRES
HOW LIKELY ARE YOU TO NOD OFF OR FALL ASLEEP WHILE WATCHING TV: 3
HOW LIKELY ARE YOU TO NOD OFF OR FALL ASLEEP WHILE SITTING INACTIVE IN A PUBLIC PLACE: 2
HOW LIKELY ARE YOU TO NOD OFF OR FALL ASLEEP WHILE SITTING AND TALKING TO SOMEONE: 1
HOW LIKELY ARE YOU TO NOD OFF OR FALL ASLEEP WHILE LYING DOWN TO REST IN THE AFTERNOON WHEN CIRCUMSTANCES PERMIT: 3
HOW LIKELY ARE YOU TO NOD OFF OR FALL ASLEEP WHEN YOU ARE A PASSENGER IN A CAR FOR AN HOUR WITHOUT A BREAK: 2
ESS TOTAL SCORE: 17
HOW LIKELY ARE YOU TO NOD OFF OR FALL ASLEEP WHILE SITTING QUIETLY AFTER LUNCH WITHOUT ALCOHOL: 2
HOW LIKELY ARE YOU TO NOD OFF OR FALL ASLEEP WHILE SITTING AND READING: 2
HOW LIKELY ARE YOU TO NOD OFF OR FALL ASLEEP IN A CAR, WHILE STOPPED FOR A FEW MINUTES IN TRAFFIC: 2

## 2024-03-05 ENCOUNTER — TELEMEDICINE (OUTPATIENT)
Age: 54
End: 2024-03-05
Payer: COMMERCIAL

## 2024-03-05 DIAGNOSIS — G47.33 OBSTRUCTIVE SLEEP APNEA (ADULT) (PEDIATRIC): Primary | ICD-10-CM

## 2024-03-05 PROCEDURE — 99213 OFFICE O/P EST LOW 20 MIN: CPT | Performed by: NURSE PRACTITIONER

## 2024-03-05 NOTE — PATIENT INSTRUCTIONS
[address]  [place] [address]  Tel.  [phone]  Fax. [phone] [address]  [place] [address]  Tel.  [phone]  Fax. [phone] [address] [place] [address]  Tel.  [phone]  Fax. [phone]     PROPER SLEEP HYGIENE    What to avoid  Do not have drinks with caffeine, such as coffee or black tea, for 8 hours before bed.  Do not smoke or use other types of tobacco near bedtime. Nicotine is a stimulant and can keep you awake.  Avoid drinking alcohol late in the evening, because it can cause you to wake in the middle of the night.  Do not eat a big meal close to bedtime. If you are hungry, eat a light snack.  Do not drink a lot of water close to bedtime, because the need to urinate may wake you up during the night.  Do not read or watch TV in bed. Use the bed only for sleeping and sexual activity.  What to try  Go to bed at the same time every night, and wake up at the same time every morning. Do not take naps during the day.  Keep your bedroom quiet, dark, and cool.  Get regular exercise, but not within 3 to 4 hours of your bedtime..  Sleep on a comfortable pillow and mattress.  If watching the clock makes you anxious, turn it facing away from you so you cannot see the time.  If you worry when you lie down, start a worry book. Well before bedtime, write down your worries, and then set the book and your concerns aside.  Try meditation or other relaxation techniques before you go to bed.  If you cannot fall asleep, get up and go to another room until you feel sleepy. Do something relaxing. Repeat your bedtime routine before you go to bed again.  Make your house quiet and calm about an hour before bedtime. Turn down the lights, turn off the TV, log off the computer, and turn down the volume on music. This can help you relax after a busy day.    Drowsy Driving  The [place] National Highway Traffic Safety Administration cites drowsiness as a

## 2024-03-05 NOTE — PROGRESS NOTES
Patient was seen for a mask fit after his follow up appointment.  Patient stated that his current mask was uncomfortable and leaking during the night.  Patient was fitted with a Medium Tomasa FFM.  He found that mask to be comfortable and will begin using it tonight.  
significant joint pain at night; Negative significant muscle pain at night; Negative rashes or itching; Negative heartburn; Negative significant mood issues    Vitals reported by patient        No data to display               Calculated BMI 29    Physical Exam completed by visual and auditory observation of patient with verbal input from patient.    General:   Alert, oriented, not in acute distress   Eyes:  Anicteric Sclerae; no obvious strabismus   Nose:  No obvious nasal septum deviation    Neck:   Midline trachea, no visible mass   Chest/Lungs:  Respiratory effort normal, no visualized signs of difficulty breathing or respiratory distress   CVS:  No JVD   Extremities:  No obvious rashes noted on face, neck, or hands   Neuro:  No facial asymmetry, no focal deficits; no obvious tremor    Psych:  Normal affect,  normal countenance     Manav Monzon is being evaluated by a Virtual Visit (video visit) encounter to address concerns as mentioned above.  A caregiver was present when appropriate. Due to this being a TeleHealth encounter (During COVID-19 public health emergency), evaluation of the following organ systems was limited: Vitals/Constitutional/EENT/Resp/CV/GI//MS/Neuro/Skin/Heme-Lymph-Imm.  Pursuant to the emergency declaration under the Green Act and the National Emergencies Act, 1135 waiver authority and the Coronavirus Preparedness and Response Supplemental Appropriations Act, this Virtual Visit was conducted with patient's (and/or legal guardian's) consent, to reduce the patient's risk of exposure to COVID-19 and provide necessary medical care. The patient (or guardian if applicable) is aware that this is a billable service, which includes applicable copays.     Patient identification was verified at the start of the visit: Yes using name and date of birth. Patient's phone number [phone] (home) [phone] (work) was confirmed for accuracy.  He gives permission for messages regarding results

## 2024-03-06 ENCOUNTER — TELEPHONE (OUTPATIENT)
Age: 54
End: 2024-03-06

## 2024-03-21 ENCOUNTER — PROCEDURE VISIT (OUTPATIENT)
Age: 54
End: 2024-03-21

## 2024-03-21 ENCOUNTER — CLINICAL DOCUMENTATION (OUTPATIENT)
Age: 54
End: 2024-03-21

## 2024-03-21 DIAGNOSIS — G47.33 OBSTRUCTIVE SLEEP APNEA (ADULT) (PEDIATRIC): Primary | ICD-10-CM

## 2024-03-21 DIAGNOSIS — G47.33 OSA (OBSTRUCTIVE SLEEP APNEA): Primary | ICD-10-CM

## 2024-03-21 NOTE — PROGRESS NOTES
He wishes to pursue [facility] evaluation.     Orders Placed This Encounter   Procedures    External Referral To ENT     Referral Priority:   Routine     Referral Type:   Eval and Treat     Referral Reason:   Specialty Services Required     Referred to Provider:   John Sierra MD     Requested Specialty:   Otolaryngology     Number of Visits Requested:   1     MERLY Olvera, [facility]   Nurse Practitioner  [facility] [place]

## 2024-03-21 NOTE — PROGRESS NOTES
Mr. Monzon was seen in Clinic for a download and mask evaluation. He stated that his current mask doesn't fit well and that he has been getting rainout. Mr. Monzon is currently using a Tomasa FFM, size: xs. He was able to try a large cushion and felt that it was better. The patient was also educated on how to adjust the humidity on his CPAP device. We also spoke about the differences between full face and nasal mask, should he want to use nasal mask in the future. A 60 day download was scanned into patient's chart.

## 2025-02-28 ENCOUNTER — TRANSCRIBE ORDERS (OUTPATIENT)
Facility: HOSPITAL | Age: 55
End: 2025-02-28

## 2025-02-28 DIAGNOSIS — R10.11 RUQ PAIN: Primary | ICD-10-CM

## 2025-03-07 ENCOUNTER — HOSPITAL ENCOUNTER (OUTPATIENT)
Facility: HOSPITAL | Age: 55
Discharge: HOME | End: 2025-03-10
Payer: COMMERCIAL

## 2025-03-07 DIAGNOSIS — R10.11 RUQ PAIN: ICD-10-CM

## 2025-03-07 PROCEDURE — 76700 US EXAM ABDOM COMPLETE: CPT
